# Patient Record
Sex: MALE | URBAN - METROPOLITAN AREA
[De-identification: names, ages, dates, MRNs, and addresses within clinical notes are randomized per-mention and may not be internally consistent; named-entity substitution may affect disease eponyms.]

---

## 2021-01-15 DIAGNOSIS — Z23 NEED FOR VACCINATION: ICD-10-CM

## 2022-01-01 ENCOUNTER — APPOINTMENT (OUTPATIENT)
Dept: RADIOLOGY | Facility: MEDICAL CENTER | Age: 77
DRG: 862 | End: 2022-01-01
Attending: STUDENT IN AN ORGANIZED HEALTH CARE EDUCATION/TRAINING PROGRAM
Payer: MEDICARE

## 2022-01-01 ENCOUNTER — HOME CARE VISIT (OUTPATIENT)
Dept: HOSPICE | Facility: HOSPICE | Age: 77
End: 2022-01-01
Payer: MEDICARE

## 2022-01-01 ENCOUNTER — APPOINTMENT (OUTPATIENT)
Dept: RADIOLOGY | Facility: MEDICAL CENTER | Age: 77
DRG: 862 | End: 2022-01-01
Attending: EMERGENCY MEDICINE
Payer: MEDICARE

## 2022-01-01 ENCOUNTER — HOSPITAL ENCOUNTER (INPATIENT)
Facility: MEDICAL CENTER | Age: 77
LOS: 1 days | DRG: 951 | End: 2022-03-10
Attending: INTERNAL MEDICINE | Admitting: INTERNAL MEDICINE
Payer: COMMERCIAL

## 2022-01-01 ENCOUNTER — HOSPICE ADMISSION (OUTPATIENT)
Dept: HOSPICE | Facility: HOSPICE | Age: 77
End: 2022-01-01
Payer: MEDICARE

## 2022-01-01 ENCOUNTER — HOSPITAL ENCOUNTER (INPATIENT)
Facility: MEDICAL CENTER | Age: 77
LOS: 5 days | DRG: 862 | End: 2022-03-09
Attending: EMERGENCY MEDICINE | Admitting: STUDENT IN AN ORGANIZED HEALTH CARE EDUCATION/TRAINING PROGRAM
Payer: MEDICARE

## 2022-01-01 ENCOUNTER — APPOINTMENT (OUTPATIENT)
Dept: CARDIOLOGY | Facility: MEDICAL CENTER | Age: 77
DRG: 862 | End: 2022-01-01
Attending: INTERNAL MEDICINE
Payer: MEDICARE

## 2022-01-01 VITALS
DIASTOLIC BLOOD PRESSURE: 72 MMHG | HEIGHT: 74 IN | OXYGEN SATURATION: 70 % | HEART RATE: 102 BPM | RESPIRATION RATE: 15 BRPM | TEMPERATURE: 97 F | SYSTOLIC BLOOD PRESSURE: 121 MMHG | WEIGHT: 209 LBS | BODY MASS INDEX: 26.82 KG/M2

## 2022-01-01 VITALS — HEART RATE: 138 BPM | OXYGEN SATURATION: 51 % | RESPIRATION RATE: 27 BRPM

## 2022-01-01 DIAGNOSIS — I74.10 AORTIC THROMBUS (HCC): ICD-10-CM

## 2022-01-01 DIAGNOSIS — D73.5 SPLENIC INFARCT: ICD-10-CM

## 2022-01-01 DIAGNOSIS — N28.0 RENAL INFARCT (HCC): ICD-10-CM

## 2022-01-01 DIAGNOSIS — I63.10 CEREBROVASCULAR ACCIDENT (CVA) DUE TO EMBOLISM OF PRECEREBRAL ARTERY (HCC): ICD-10-CM

## 2022-01-01 DIAGNOSIS — I63.423 CEREBROVASCULAR ACCIDENT (CVA) DUE TO BILATERAL EMBOLISM OF ANTERIOR CEREBRAL ARTERIES (HCC): ICD-10-CM

## 2022-01-01 DIAGNOSIS — A41.9 SEPTIC SHOCK (HCC): ICD-10-CM

## 2022-01-01 DIAGNOSIS — J96.01 ACUTE RESPIRATORY FAILURE WITH HYPOXIA (HCC): ICD-10-CM

## 2022-01-01 DIAGNOSIS — I26.01 ACUTE SEPTIC PULMONARY EMBOLISM WITH ACUTE COR PULMONALE (HCC): ICD-10-CM

## 2022-01-01 DIAGNOSIS — R65.21 SEPTIC SHOCK (HCC): ICD-10-CM

## 2022-01-01 LAB
ABO + RH BLD: NORMAL
ABO GROUP BLD: NORMAL
ALBUMIN SERPL BCP-MCNC: 2.7 G/DL (ref 3.2–4.9)
ALBUMIN SERPL BCP-MCNC: 3 G/DL (ref 3.2–4.9)
ALBUMIN SERPL BCP-MCNC: 3.1 G/DL (ref 3.2–4.9)
ALBUMIN/GLOB SERPL: 0.8 G/DL
ALBUMIN/GLOB SERPL: 0.9 G/DL
ALBUMIN/GLOB SERPL: 0.9 G/DL
ALP SERPL-CCNC: 85 U/L (ref 30–99)
ALP SERPL-CCNC: 86 U/L (ref 30–99)
ALP SERPL-CCNC: 92 U/L (ref 30–99)
ALT SERPL-CCNC: 14 U/L (ref 2–50)
ALT SERPL-CCNC: 15 U/L (ref 2–50)
ALT SERPL-CCNC: 19 U/L (ref 2–50)
ANION GAP SERPL CALC-SCNC: 10 MMOL/L (ref 7–16)
ANION GAP SERPL CALC-SCNC: 11 MMOL/L (ref 7–16)
ANION GAP SERPL CALC-SCNC: 12 MMOL/L (ref 7–16)
ANION GAP SERPL CALC-SCNC: 13 MMOL/L (ref 7–16)
ANION GAP SERPL CALC-SCNC: 16 MMOL/L (ref 7–16)
ANION GAP SERPL CALC-SCNC: 9 MMOL/L (ref 7–16)
APPEARANCE UR: CLEAR
APTT PPP: 145.9 SEC (ref 24.7–36)
APTT PPP: 30.5 SEC (ref 24.7–36)
APTT PPP: 65.8 SEC (ref 24.7–36)
AST SERPL-CCNC: 17 U/L (ref 12–45)
AST SERPL-CCNC: 19 U/L (ref 12–45)
AST SERPL-CCNC: 26 U/L (ref 12–45)
BACTERIA #/AREA URNS HPF: NEGATIVE /HPF
BACTERIA BLD CULT: NORMAL
BACTERIA SPEC RESP CULT: NORMAL
BACTERIA UR CULT: NORMAL
BASE EXCESS BLDA CALC-SCNC: -1 MMOL/L (ref -4–3)
BASE EXCESS BLDA CALC-SCNC: -3 MMOL/L (ref -4–3)
BASE EXCESS BLDA CALC-SCNC: -6 MMOL/L (ref -4–3)
BASE EXCESS BLDV CALC-SCNC: 0 MMOL/L (ref -4–3)
BASOPHILS # BLD AUTO: 0.2 % (ref 0–1.8)
BASOPHILS # BLD AUTO: 0.3 % (ref 0–1.8)
BASOPHILS # BLD AUTO: 0.4 % (ref 0–1.8)
BASOPHILS # BLD AUTO: 0.5 % (ref 0–1.8)
BASOPHILS # BLD: 0.02 K/UL (ref 0–0.12)
BASOPHILS # BLD: 0.03 K/UL (ref 0–0.12)
BASOPHILS # BLD: 0.05 K/UL (ref 0–0.12)
BASOPHILS # BLD: 0.06 K/UL (ref 0–0.12)
BILIRUB SERPL-MCNC: 0.4 MG/DL (ref 0.1–1.5)
BILIRUB SERPL-MCNC: 0.4 MG/DL (ref 0.1–1.5)
BILIRUB SERPL-MCNC: 0.6 MG/DL (ref 0.1–1.5)
BILIRUB UR QL STRIP.AUTO: NEGATIVE
BLD GP AB SCN SERPL QL: NORMAL
BODY TEMPERATURE: ABNORMAL DEGREES
BREATHS SETTING VENT: 18
BREATHS SETTING VENT: 20
BREATHS SETTING VENT: 20
BREATHS SETTING VENT: 26
BUN SERPL-MCNC: 19 MG/DL (ref 8–22)
BUN SERPL-MCNC: 21 MG/DL (ref 8–22)
BUN SERPL-MCNC: 24 MG/DL (ref 8–22)
BUN SERPL-MCNC: 25 MG/DL (ref 8–22)
BUN SERPL-MCNC: 26 MG/DL (ref 8–22)
BUN SERPL-MCNC: 26 MG/DL (ref 8–22)
CA-I BLD ISE-SCNC: 1.32 MMOL/L (ref 1.1–1.3)
CALCIUM SERPL-MCNC: 8.7 MG/DL (ref 8.5–10.5)
CALCIUM SERPL-MCNC: 8.8 MG/DL (ref 8.5–10.5)
CALCIUM SERPL-MCNC: 8.8 MG/DL (ref 8.5–10.5)
CALCIUM SERPL-MCNC: 9 MG/DL (ref 8.5–10.5)
CALCIUM SERPL-MCNC: 9.3 MG/DL (ref 8.5–10.5)
CALCIUM SERPL-MCNC: 9.3 MG/DL (ref 8.5–10.5)
CHLORIDE SERPL-SCNC: 100 MMOL/L (ref 96–112)
CHLORIDE SERPL-SCNC: 101 MMOL/L (ref 96–112)
CHLORIDE SERPL-SCNC: 101 MMOL/L (ref 96–112)
CHLORIDE SERPL-SCNC: 110 MMOL/L (ref 96–112)
CHLORIDE SERPL-SCNC: 118 MMOL/L (ref 96–112)
CHLORIDE SERPL-SCNC: 120 MMOL/L (ref 96–112)
CHOLEST SERPL-MCNC: 74 MG/DL (ref 100–199)
CHOLEST SERPL-MCNC: 78 MG/DL (ref 100–199)
CO2 BLDA-SCNC: 20 MMOL/L (ref 20–33)
CO2 BLDA-SCNC: 22 MMOL/L (ref 20–33)
CO2 BLDA-SCNC: 27 MMOL/L (ref 20–33)
CO2 BLDV-SCNC: 25 MMOL/L (ref 20–33)
CO2 SERPL-SCNC: 17 MMOL/L (ref 20–33)
CO2 SERPL-SCNC: 19 MMOL/L (ref 20–33)
CO2 SERPL-SCNC: 20 MMOL/L (ref 20–33)
CO2 SERPL-SCNC: 20 MMOL/L (ref 20–33)
CO2 SERPL-SCNC: 23 MMOL/L (ref 20–33)
CO2 SERPL-SCNC: 24 MMOL/L (ref 20–33)
COLOR UR: YELLOW
CREAT SERPL-MCNC: 0.96 MG/DL (ref 0.5–1.4)
CREAT SERPL-MCNC: 0.97 MG/DL (ref 0.5–1.4)
CREAT SERPL-MCNC: 0.98 MG/DL (ref 0.5–1.4)
CREAT SERPL-MCNC: 1.01 MG/DL (ref 0.5–1.4)
CREAT SERPL-MCNC: 1.11 MG/DL (ref 0.5–1.4)
CREAT SERPL-MCNC: 1.15 MG/DL (ref 0.5–1.4)
CRP SERPL HS-MCNC: 12.69 MG/DL (ref 0–0.75)
CRP SERPL HS-MCNC: 16.09 MG/DL (ref 0–0.75)
CRP SERPL HS-MCNC: 17.02 MG/DL (ref 0–0.75)
DELSYS IDSYS: ABNORMAL
EKG IMPRESSION: NORMAL
EKG IMPRESSION: NORMAL
END TIDAL CARBON DIOXIDE IECO2: 22 MMHG
END TIDAL CARBON DIOXIDE IECO2: 28 MMHG
END TIDAL CARBON DIOXIDE IECO2: 29 MMHG
EOSINOPHIL # BLD AUTO: 0.02 K/UL (ref 0–0.51)
EOSINOPHIL # BLD AUTO: 0.09 K/UL (ref 0–0.51)
EOSINOPHIL # BLD AUTO: 0.13 K/UL (ref 0–0.51)
EOSINOPHIL # BLD AUTO: 0.14 K/UL (ref 0–0.51)
EOSINOPHIL # BLD AUTO: 0.21 K/UL (ref 0–0.51)
EOSINOPHIL # BLD AUTO: 0.28 K/UL (ref 0–0.51)
EOSINOPHIL NFR BLD: 0.1 % (ref 0–6.9)
EOSINOPHIL NFR BLD: 0.6 % (ref 0–6.9)
EOSINOPHIL NFR BLD: 0.9 % (ref 0–6.9)
EOSINOPHIL NFR BLD: 1.6 % (ref 0–6.9)
EOSINOPHIL NFR BLD: 2 % (ref 0–6.9)
EOSINOPHIL NFR BLD: 2.5 % (ref 0–6.9)
EPI CELLS #/AREA URNS HPF: NEGATIVE /HPF
ERYTHROCYTE [DISTWIDTH] IN BLOOD BY AUTOMATED COUNT: 46.1 FL (ref 35.9–50)
ERYTHROCYTE [DISTWIDTH] IN BLOOD BY AUTOMATED COUNT: 47.3 FL (ref 35.9–50)
ERYTHROCYTE [DISTWIDTH] IN BLOOD BY AUTOMATED COUNT: 47.3 FL (ref 35.9–50)
ERYTHROCYTE [DISTWIDTH] IN BLOOD BY AUTOMATED COUNT: 48.3 FL (ref 35.9–50)
ERYTHROCYTE [DISTWIDTH] IN BLOOD BY AUTOMATED COUNT: 48.6 FL (ref 35.9–50)
ERYTHROCYTE [DISTWIDTH] IN BLOOD BY AUTOMATED COUNT: 48.6 FL (ref 35.9–50)
ERYTHROCYTE [SEDIMENTATION RATE] IN BLOOD BY WESTERGREN METHOD: 85 MM/HOUR (ref 0–20)
EST. AVERAGE GLUCOSE BLD GHB EST-MCNC: 123 MG/DL
FLUAV RNA SPEC QL NAA+PROBE: NEGATIVE
FLUBV RNA SPEC QL NAA+PROBE: NEGATIVE
GLOBULIN SER CALC-MCNC: 3.2 G/DL (ref 1.9–3.5)
GLOBULIN SER CALC-MCNC: 3.4 G/DL (ref 1.9–3.5)
GLOBULIN SER CALC-MCNC: 3.5 G/DL (ref 1.9–3.5)
GLUCOSE BLD STRIP.AUTO-MCNC: 107 MG/DL (ref 65–99)
GLUCOSE BLD STRIP.AUTO-MCNC: 109 MG/DL (ref 65–99)
GLUCOSE BLD STRIP.AUTO-MCNC: 109 MG/DL (ref 65–99)
GLUCOSE BLD STRIP.AUTO-MCNC: 110 MG/DL (ref 65–99)
GLUCOSE BLD STRIP.AUTO-MCNC: 111 MG/DL (ref 65–99)
GLUCOSE BLD STRIP.AUTO-MCNC: 113 MG/DL (ref 65–99)
GLUCOSE BLD STRIP.AUTO-MCNC: 114 MG/DL (ref 65–99)
GLUCOSE BLD STRIP.AUTO-MCNC: 116 MG/DL (ref 65–99)
GLUCOSE BLD STRIP.AUTO-MCNC: 117 MG/DL (ref 65–99)
GLUCOSE BLD STRIP.AUTO-MCNC: 125 MG/DL (ref 65–99)
GLUCOSE BLD STRIP.AUTO-MCNC: 127 MG/DL (ref 65–99)
GLUCOSE BLD STRIP.AUTO-MCNC: 136 MG/DL (ref 65–99)
GLUCOSE BLD STRIP.AUTO-MCNC: 140 MG/DL (ref 65–99)
GLUCOSE BLD STRIP.AUTO-MCNC: 156 MG/DL (ref 65–99)
GLUCOSE BLD STRIP.AUTO-MCNC: 193 MG/DL (ref 65–99)
GLUCOSE SERPL-MCNC: 138 MG/DL (ref 65–99)
GLUCOSE SERPL-MCNC: 141 MG/DL (ref 65–99)
GLUCOSE SERPL-MCNC: 142 MG/DL (ref 65–99)
GLUCOSE SERPL-MCNC: 181 MG/DL (ref 65–99)
GLUCOSE SERPL-MCNC: 217 MG/DL (ref 65–99)
GLUCOSE SERPL-MCNC: 220 MG/DL (ref 65–99)
GLUCOSE UR STRIP.AUTO-MCNC: NEGATIVE MG/DL
GRAM STN SPEC: NORMAL
GRAM STN SPEC: NORMAL
HBA1C MFR BLD: 5.9 % (ref 4–5.6)
HCO3 BLDA-SCNC: 19 MMOL/L (ref 17–25)
HCO3 BLDA-SCNC: 21.1 MMOL/L (ref 17–25)
HCO3 BLDA-SCNC: 25.4 MMOL/L (ref 17–25)
HCO3 BLDV-SCNC: 24 MMOL/L (ref 24–28)
HCT VFR BLD AUTO: 28.9 % (ref 42–52)
HCT VFR BLD AUTO: 30 % (ref 42–52)
HCT VFR BLD AUTO: 31 % (ref 42–52)
HCT VFR BLD AUTO: 31.7 % (ref 42–52)
HCT VFR BLD AUTO: 31.7 % (ref 42–52)
HCT VFR BLD AUTO: 32.2 % (ref 42–52)
HCT VFR BLD CALC: 27 % (ref 42–52)
HDLC SERPL-MCNC: 28 MG/DL
HDLC SERPL-MCNC: 29 MG/DL
HGB BLD-MCNC: 10.1 G/DL (ref 14–18)
HGB BLD-MCNC: 10.2 G/DL (ref 14–18)
HGB BLD-MCNC: 8.9 G/DL (ref 14–18)
HGB BLD-MCNC: 9.2 G/DL (ref 14–18)
HGB BLD-MCNC: 9.2 G/DL (ref 14–18)
HGB BLD-MCNC: 9.5 G/DL (ref 14–18)
HGB BLD-MCNC: 9.9 G/DL (ref 14–18)
HOROWITZ INDEX BLDA+IHG-RTO: 172 MM[HG]
HOROWITZ INDEX BLDA+IHG-RTO: 223 MM[HG]
HOROWITZ INDEX BLDA+IHG-RTO: 300 MM[HG]
HOROWITZ INDEX BLDV+IHG-RTO: 80 MM[HG]
HYALINE CASTS #/AREA URNS LPF: ABNORMAL /LPF
IMM GRANULOCYTES # BLD AUTO: 0.05 K/UL (ref 0–0.11)
IMM GRANULOCYTES # BLD AUTO: 0.05 K/UL (ref 0–0.11)
IMM GRANULOCYTES # BLD AUTO: 0.06 K/UL (ref 0–0.11)
IMM GRANULOCYTES # BLD AUTO: 0.07 K/UL (ref 0–0.11)
IMM GRANULOCYTES # BLD AUTO: 0.08 K/UL (ref 0–0.11)
IMM GRANULOCYTES # BLD AUTO: 0.08 K/UL (ref 0–0.11)
IMM GRANULOCYTES NFR BLD AUTO: 0.5 % (ref 0–0.9)
IMM GRANULOCYTES NFR BLD AUTO: 0.6 % (ref 0–0.9)
IMM GRANULOCYTES NFR BLD AUTO: 0.7 % (ref 0–0.9)
INR PPP: 1.35 (ref 0.87–1.13)
INR PPP: 1.44 (ref 0.87–1.13)
INR PPP: 1.48 (ref 0.87–1.13)
KETONES UR STRIP.AUTO-MCNC: NEGATIVE MG/DL
LACTATE BLD-SCNC: 1.1 MMOL/L (ref 0.5–2)
LACTATE BLD-SCNC: 1.2 MMOL/L (ref 0.5–2)
LACTATE BLD-SCNC: 2.3 MMOL/L (ref 0.5–2)
LDLC SERPL CALC-MCNC: 34 MG/DL
LDLC SERPL CALC-MCNC: 37 MG/DL
LEUKOCYTE ESTERASE UR QL STRIP.AUTO: NEGATIVE
LV EJECT FRACT  99904: 55
LYMPHOCYTES # BLD AUTO: 0.68 K/UL (ref 1–4.8)
LYMPHOCYTES # BLD AUTO: 0.78 K/UL (ref 1–4.8)
LYMPHOCYTES # BLD AUTO: 0.99 K/UL (ref 1–4.8)
LYMPHOCYTES # BLD AUTO: 1.08 K/UL (ref 1–4.8)
LYMPHOCYTES # BLD AUTO: 1.24 K/UL (ref 1–4.8)
LYMPHOCYTES # BLD AUTO: 1.43 K/UL (ref 1–4.8)
LYMPHOCYTES NFR BLD: 10.1 % (ref 22–41)
LYMPHOCYTES NFR BLD: 6.4 % (ref 22–41)
LYMPHOCYTES NFR BLD: 7.1 % (ref 22–41)
LYMPHOCYTES NFR BLD: 8.7 % (ref 22–41)
LYMPHOCYTES NFR BLD: 8.8 % (ref 22–41)
LYMPHOCYTES NFR BLD: 8.9 % (ref 22–41)
MAGNESIUM SERPL-MCNC: 2.1 MG/DL (ref 1.5–2.5)
MAGNESIUM SERPL-MCNC: 2.2 MG/DL (ref 1.5–2.5)
MAGNESIUM SERPL-MCNC: 2.3 MG/DL (ref 1.5–2.5)
MCH RBC QN AUTO: 27.1 PG (ref 27–33)
MCH RBC QN AUTO: 27.4 PG (ref 27–33)
MCH RBC QN AUTO: 27.5 PG (ref 27–33)
MCH RBC QN AUTO: 27.6 PG (ref 27–33)
MCHC RBC AUTO-ENTMCNC: 30.6 G/DL (ref 33.7–35.3)
MCHC RBC AUTO-ENTMCNC: 30.7 G/DL (ref 33.7–35.3)
MCHC RBC AUTO-ENTMCNC: 30.8 G/DL (ref 33.7–35.3)
MCHC RBC AUTO-ENTMCNC: 31.2 G/DL (ref 33.7–35.3)
MCHC RBC AUTO-ENTMCNC: 31.4 G/DL (ref 33.7–35.3)
MCHC RBC AUTO-ENTMCNC: 32.2 G/DL (ref 33.7–35.3)
MCV RBC AUTO: 85.9 FL (ref 81.4–97.8)
MCV RBC AUTO: 87.3 FL (ref 81.4–97.8)
MCV RBC AUTO: 87.8 FL (ref 81.4–97.8)
MCV RBC AUTO: 88.6 FL (ref 81.4–97.8)
MCV RBC AUTO: 88.9 FL (ref 81.4–97.8)
MCV RBC AUTO: 89.6 FL (ref 81.4–97.8)
MICRO URNS: ABNORMAL
MODE IMODE: ABNORMAL
MONOCYTES # BLD AUTO: 0.64 K/UL (ref 0–0.85)
MONOCYTES # BLD AUTO: 0.74 K/UL (ref 0–0.85)
MONOCYTES # BLD AUTO: 0.76 K/UL (ref 0–0.85)
MONOCYTES # BLD AUTO: 0.9 K/UL (ref 0–0.85)
MONOCYTES # BLD AUTO: 1 K/UL (ref 0–0.85)
MONOCYTES # BLD AUTO: 1.02 K/UL (ref 0–0.85)
MONOCYTES NFR BLD AUTO: 6.3 % (ref 0–13.4)
MONOCYTES NFR BLD AUTO: 6.7 % (ref 0–13.4)
MONOCYTES NFR BLD AUTO: 6.7 % (ref 0–13.4)
MONOCYTES NFR BLD AUTO: 7 % (ref 0–13.4)
MONOCYTES NFR BLD AUTO: 7.1 % (ref 0–13.4)
MONOCYTES NFR BLD AUTO: 7.2 % (ref 0–13.4)
NEUTROPHILS # BLD AUTO: 11.72 K/UL (ref 1.82–7.42)
NEUTROPHILS # BLD AUTO: 11.8 K/UL (ref 1.82–7.42)
NEUTROPHILS # BLD AUTO: 12.86 K/UL (ref 1.82–7.42)
NEUTROPHILS # BLD AUTO: 7.22 K/UL (ref 1.82–7.42)
NEUTROPHILS # BLD AUTO: 8.97 K/UL (ref 1.82–7.42)
NEUTROPHILS # BLD AUTO: 8.99 K/UL (ref 1.82–7.42)
NEUTROPHILS NFR BLD: 80.9 % (ref 44–72)
NEUTROPHILS NFR BLD: 81.5 % (ref 44–72)
NEUTROPHILS NFR BLD: 82.5 % (ref 44–72)
NEUTROPHILS NFR BLD: 82.6 % (ref 44–72)
NEUTROPHILS NFR BLD: 83.7 % (ref 44–72)
NEUTROPHILS NFR BLD: 84.8 % (ref 44–72)
NITRITE UR QL STRIP.AUTO: NEGATIVE
NRBC # BLD AUTO: 0 K/UL
NRBC BLD-RTO: 0 /100 WBC
NT-PROBNP SERPL IA-MCNC: 318 PG/ML (ref 0–125)
O2/TOTAL GAS SETTING VFR VENT: 100 %
O2/TOTAL GAS SETTING VFR VENT: 40 %
PCO2 BLDA: 32.4 MMHG (ref 26–37)
PCO2 BLDA: 33.3 MMHG (ref 26–37)
PCO2 BLDA: 47.9 MMHG (ref 26–37)
PCO2 BLDV: 35.3 MMHG (ref 41–51)
PCO2 TEMP ADJ BLDA: 32.4 MMHG (ref 26–37)
PCO2 TEMP ADJ BLDA: 33.4 MMHG (ref 26–37)
PCO2 TEMP ADJ BLDA: 46.6 MMHG (ref 26–37)
PCO2 TEMP ADJ BLDV: 34.2 MMHG (ref 41–51)
PEEP END EXPIRATORY PRESSURE IPEEP: 8 CMH20
PH BLDA: 7.33 [PH] (ref 7.4–7.5)
PH BLDA: 7.37 [PH] (ref 7.4–7.5)
PH BLDA: 7.42 [PH] (ref 7.4–7.5)
PH BLDV: 7.44 [PH] (ref 7.31–7.45)
PH TEMP ADJ BLDA: 7.34 [PH] (ref 7.4–7.5)
PH TEMP ADJ BLDA: 7.36 [PH] (ref 7.4–7.5)
PH TEMP ADJ BLDA: 7.42 [PH] (ref 7.4–7.5)
PH TEMP ADJ BLDV: 7.45 [PH] (ref 7.31–7.45)
PH UR STRIP.AUTO: 6 [PH] (ref 5–8)
PHOSPHATE SERPL-MCNC: 2.5 MG/DL (ref 2.5–4.5)
PHOSPHATE SERPL-MCNC: 3.2 MG/DL (ref 2.5–4.5)
PHOSPHATE SERPL-MCNC: 3.3 MG/DL (ref 2.5–4.5)
PLATELET # BLD AUTO: 209 K/UL (ref 164–446)
PLATELET # BLD AUTO: 214 K/UL (ref 164–446)
PLATELET # BLD AUTO: 229 K/UL (ref 164–446)
PLATELET # BLD AUTO: 276 K/UL (ref 164–446)
PLATELET # BLD AUTO: 301 K/UL (ref 164–446)
PLATELET # BLD AUTO: 301 K/UL (ref 164–446)
PMV BLD AUTO: 9.2 FL (ref 9–12.9)
PMV BLD AUTO: 9.4 FL (ref 9–12.9)
PMV BLD AUTO: 9.6 FL (ref 9–12.9)
PMV BLD AUTO: 9.8 FL (ref 9–12.9)
PO2 BLDA: 120 MMHG (ref 64–87)
PO2 BLDA: 172 MMHG (ref 64–87)
PO2 BLDA: 89 MMHG (ref 64–87)
PO2 BLDV: 32 MMHG (ref 25–40)
PO2 TEMP ADJ BLDA: 121 MMHG (ref 64–87)
PO2 TEMP ADJ BLDA: 168 MMHG (ref 64–87)
PO2 TEMP ADJ BLDA: 89 MMHG (ref 64–87)
PO2 TEMP ADJ BLDV: 31 MMHG (ref 25–40)
POTASSIUM BLD-SCNC: 4.2 MMOL/L (ref 3.6–5.5)
POTASSIUM SERPL-SCNC: 3.5 MMOL/L (ref 3.6–5.5)
POTASSIUM SERPL-SCNC: 4.1 MMOL/L (ref 3.6–5.5)
POTASSIUM SERPL-SCNC: 4.3 MMOL/L (ref 3.6–5.5)
POTASSIUM SERPL-SCNC: 4.4 MMOL/L (ref 3.6–5.5)
POTASSIUM SERPL-SCNC: 4.4 MMOL/L (ref 3.6–5.5)
POTASSIUM SERPL-SCNC: 4.5 MMOL/L (ref 3.6–5.5)
PREALB SERPL-MCNC: 6.3 MG/DL (ref 18–38)
PROCALCITONIN SERPL-MCNC: 0.13 NG/ML
PROT SERPL-MCNC: 5.9 G/DL (ref 6–8.2)
PROT SERPL-MCNC: 6.4 G/DL (ref 6–8.2)
PROT SERPL-MCNC: 6.6 G/DL (ref 6–8.2)
PROT UR QL STRIP: 30 MG/DL
PROTHROMBIN TIME: 16.2 SEC (ref 12–14.6)
PROTHROMBIN TIME: 17.1 SEC (ref 12–14.6)
PROTHROMBIN TIME: 17.5 SEC (ref 12–14.6)
RBC # BLD AUTO: 3.25 M/UL (ref 4.7–6.1)
RBC # BLD AUTO: 3.35 M/UL (ref 4.7–6.1)
RBC # BLD AUTO: 3.5 M/UL (ref 4.7–6.1)
RBC # BLD AUTO: 3.61 M/UL (ref 4.7–6.1)
RBC # BLD AUTO: 3.69 M/UL (ref 4.7–6.1)
RBC # BLD AUTO: 3.69 M/UL (ref 4.7–6.1)
RBC # URNS HPF: ABNORMAL /HPF
RBC UR QL AUTO: NEGATIVE
RH BLD: NORMAL
RSV RNA SPEC QL NAA+PROBE: NEGATIVE
SAO2 % BLDA: 97 % (ref 93–99)
SAO2 % BLDA: 99 % (ref 93–99)
SAO2 % BLDA: 99 % (ref 93–99)
SAO2 % BLDV: 66 %
SARS-COV-2 AB SERPL QL IA: NORMAL
SARS-COV-2 RNA RESP QL NAA+PROBE: NOTDETECTED
SIGNIFICANT IND 70042: NORMAL
SITE SITE: NORMAL
SODIUM BLD-SCNC: 138 MMOL/L (ref 135–145)
SODIUM SERPL-SCNC: 134 MMOL/L (ref 135–145)
SODIUM SERPL-SCNC: 135 MMOL/L (ref 135–145)
SODIUM SERPL-SCNC: 137 MMOL/L (ref 135–145)
SODIUM SERPL-SCNC: 137 MMOL/L (ref 135–145)
SODIUM SERPL-SCNC: 138 MMOL/L (ref 135–145)
SODIUM SERPL-SCNC: 139 MMOL/L (ref 135–145)
SODIUM SERPL-SCNC: 139 MMOL/L (ref 135–145)
SODIUM SERPL-SCNC: 146 MMOL/L (ref 135–145)
SODIUM SERPL-SCNC: 147 MMOL/L (ref 135–145)
SODIUM SERPL-SCNC: 147 MMOL/L (ref 135–145)
SODIUM SERPL-SCNC: 148 MMOL/L (ref 135–145)
SODIUM SERPL-SCNC: 151 MMOL/L (ref 135–145)
SODIUM SERPL-SCNC: 152 MMOL/L (ref 135–145)
SODIUM SERPL-SCNC: 153 MMOL/L (ref 135–145)
SODIUM SERPL-SCNC: 155 MMOL/L (ref 135–145)
SOURCE SOURCE: NORMAL
SP GR UR STRIP.AUTO: 1.02
SPECIMEN DRAWN FROM PATIENT: ABNORMAL
SPECIMEN SOURCE: NORMAL
TIDAL VOLUME IVT: 440 ML
TIDAL VOLUME IVT: 500 ML
TRIGL SERPL-MCNC: 59 MG/DL (ref 0–149)
TRIGL SERPL-MCNC: 60 MG/DL (ref 0–149)
TRIGL SERPL-MCNC: 63 MG/DL (ref 0–149)
TROPONIN T SERPL-MCNC: 284 NG/L (ref 6–19)
TROPONIN T SERPL-MCNC: 313 NG/L (ref 6–19)
TROPONIN T SERPL-MCNC: 32 NG/L (ref 6–19)
TROPONIN T SERPL-MCNC: 406 NG/L (ref 6–19)
TROPONIN T SERPL-MCNC: 430 NG/L (ref 6–19)
TROPONIN T SERPL-MCNC: 549 NG/L (ref 6–19)
TROPONIN T SERPL-MCNC: 646 NG/L (ref 6–19)
TSH SERPL DL<=0.005 MIU/L-ACNC: 2.54 UIU/ML (ref 0.38–5.33)
UFH PPP CHRO-ACNC: 0.15 IU/ML
UFH PPP CHRO-ACNC: 0.24 IU/ML
UFH PPP CHRO-ACNC: 0.28 IU/ML
UFH PPP CHRO-ACNC: 0.36 IU/ML
UFH PPP CHRO-ACNC: 0.43 IU/ML
UFH PPP CHRO-ACNC: 0.52 IU/ML
UFH PPP CHRO-ACNC: 0.77 IU/ML
UFH PPP CHRO-ACNC: <0.1 IU/ML
UROBILINOGEN UR STRIP.AUTO-MCNC: 1 MG/DL
VANCOMYCIN PEAK SERPL-MCNC: 21.7 UG/ML (ref 20–40)
VANCOMYCIN TROUGH SERPL-MCNC: 16 UG/ML (ref 10–20)
VIT B12 SERPL-MCNC: 387 PG/ML (ref 211–911)
WBC # BLD AUTO: 10.7 K/UL (ref 4.8–10.8)
WBC # BLD AUTO: 11.1 K/UL (ref 4.8–10.8)
WBC # BLD AUTO: 14.2 K/UL (ref 4.8–10.8)
WBC # BLD AUTO: 14.3 K/UL (ref 4.8–10.8)
WBC # BLD AUTO: 15.2 K/UL (ref 4.8–10.8)
WBC # BLD AUTO: 8.9 K/UL (ref 4.8–10.8)
WBC #/AREA URNS HPF: ABNORMAL /HPF

## 2022-01-01 PROCEDURE — 700101 HCHG RX REV CODE 250: Performed by: EMERGENCY MEDICINE

## 2022-01-01 PROCEDURE — 80048 BASIC METABOLIC PNL TOTAL CA: CPT

## 2022-01-01 PROCEDURE — A9270 NON-COVERED ITEM OR SERVICE: HCPCS | Performed by: PSYCHIATRY & NEUROLOGY

## 2022-01-01 PROCEDURE — 72158 MRI LUMBAR SPINE W/O & W/DYE: CPT

## 2022-01-01 PROCEDURE — 99233 SBSQ HOSP IP/OBS HIGH 50: CPT | Performed by: SURGERY

## 2022-01-01 PROCEDURE — 0241U HCHG SARS-COV-2 COVID-19 NFCT DS RESP RNA 4 TRGT MIC: CPT

## 2022-01-01 PROCEDURE — 70450 CT HEAD/BRAIN W/O DYE: CPT | Mod: MG

## 2022-01-01 PROCEDURE — 74177 CT ABD & PELVIS W/CONTRAST: CPT

## 2022-01-01 PROCEDURE — 96365 THER/PROPH/DIAG IV INF INIT: CPT

## 2022-01-01 PROCEDURE — 71045 X-RAY EXAM CHEST 1 VIEW: CPT

## 2022-01-01 PROCEDURE — 700102 HCHG RX REV CODE 250 W/ 637 OVERRIDE(OP): Performed by: STUDENT IN AN ORGANIZED HEALTH CARE EDUCATION/TRAINING PROGRAM

## 2022-01-01 PROCEDURE — 700111 HCHG RX REV CODE 636 W/ 250 OVERRIDE (IP): Performed by: STUDENT IN AN ORGANIZED HEALTH CARE EDUCATION/TRAINING PROGRAM

## 2022-01-01 PROCEDURE — 31500 INSERT EMERGENCY AIRWAY: CPT

## 2022-01-01 PROCEDURE — T2045 HOSPICE GENERAL CARE: HCPCS

## 2022-01-01 PROCEDURE — 87040 BLOOD CULTURE FOR BACTERIA: CPT

## 2022-01-01 PROCEDURE — 82962 GLUCOSE BLOOD TEST: CPT | Mod: 91

## 2022-01-01 PROCEDURE — A9270 NON-COVERED ITEM OR SERVICE: HCPCS | Performed by: STUDENT IN AN ORGANIZED HEALTH CARE EDUCATION/TRAINING PROGRAM

## 2022-01-01 PROCEDURE — 84100 ASSAY OF PHOSPHORUS: CPT

## 2022-01-01 PROCEDURE — 84478 ASSAY OF TRIGLYCERIDES: CPT

## 2022-01-01 PROCEDURE — 85730 THROMBOPLASTIN TIME PARTIAL: CPT

## 2022-01-01 PROCEDURE — 80053 COMPREHEN METABOLIC PANEL: CPT

## 2022-01-01 PROCEDURE — 93005 ELECTROCARDIOGRAM TRACING: CPT | Performed by: STUDENT IN AN ORGANIZED HEALTH CARE EDUCATION/TRAINING PROGRAM

## 2022-01-01 PROCEDURE — 700105 HCHG RX REV CODE 258: Performed by: STUDENT IN AN ORGANIZED HEALTH CARE EDUCATION/TRAINING PROGRAM

## 2022-01-01 PROCEDURE — 71275 CT ANGIOGRAPHY CHEST: CPT

## 2022-01-01 PROCEDURE — 82803 BLOOD GASES ANY COMBINATION: CPT

## 2022-01-01 PROCEDURE — 700101 HCHG RX REV CODE 250: Performed by: STUDENT IN AN ORGANIZED HEALTH CARE EDUCATION/TRAINING PROGRAM

## 2022-01-01 PROCEDURE — 700117 HCHG RX CONTRAST REV CODE 255: Performed by: STUDENT IN AN ORGANIZED HEALTH CARE EDUCATION/TRAINING PROGRAM

## 2022-01-01 PROCEDURE — 94799 UNLISTED PULMONARY SVC/PX: CPT

## 2022-01-01 PROCEDURE — 87205 SMEAR GRAM STAIN: CPT

## 2022-01-01 PROCEDURE — 99223 1ST HOSP IP/OBS HIGH 75: CPT | Mod: FS | Performed by: NURSE PRACTITIONER

## 2022-01-01 PROCEDURE — 85025 COMPLETE CBC W/AUTO DIFF WBC: CPT

## 2022-01-01 PROCEDURE — 86140 C-REACTIVE PROTEIN: CPT | Mod: 91

## 2022-01-01 PROCEDURE — 94003 VENT MGMT INPAT SUBQ DAY: CPT

## 2022-01-01 PROCEDURE — 700111 HCHG RX REV CODE 636 W/ 250 OVERRIDE (IP)

## 2022-01-01 PROCEDURE — 700102 HCHG RX REV CODE 250 W/ 637 OVERRIDE(OP): Performed by: INTERNAL MEDICINE

## 2022-01-01 PROCEDURE — 86769 SARS-COV-2 COVID-19 ANTIBODY: CPT

## 2022-01-01 PROCEDURE — 84295 ASSAY OF SERUM SODIUM: CPT | Mod: 91

## 2022-01-01 PROCEDURE — 37799 UNLISTED PX VASCULAR SURGERY: CPT

## 2022-01-01 PROCEDURE — 83735 ASSAY OF MAGNESIUM: CPT

## 2022-01-01 PROCEDURE — 84134 ASSAY OF PREALBUMIN: CPT

## 2022-01-01 PROCEDURE — 36620 INSERTION CATHETER ARTERY: CPT | Performed by: INTERNAL MEDICINE

## 2022-01-01 PROCEDURE — 80202 ASSAY OF VANCOMYCIN: CPT | Mod: 91

## 2022-01-01 PROCEDURE — 99292 CRITICAL CARE ADDL 30 MIN: CPT | Performed by: STUDENT IN AN ORGANIZED HEALTH CARE EDUCATION/TRAINING PROGRAM

## 2022-01-01 PROCEDURE — 99223 1ST HOSP IP/OBS HIGH 75: CPT | Performed by: PHYSICAL MEDICINE & REHABILITATION

## 2022-01-01 PROCEDURE — 94640 AIRWAY INHALATION TREATMENT: CPT

## 2022-01-01 PROCEDURE — 93925 LOWER EXTREMITY STUDY: CPT

## 2022-01-01 PROCEDURE — 85520 HEPARIN ASSAY: CPT | Mod: 91

## 2022-01-01 PROCEDURE — 85520 HEPARIN ASSAY: CPT

## 2022-01-01 PROCEDURE — 86850 RBC ANTIBODY SCREEN: CPT

## 2022-01-01 PROCEDURE — 85730 THROMBOPLASTIN TIME PARTIAL: CPT | Mod: 91

## 2022-01-01 PROCEDURE — 94002 VENT MGMT INPAT INIT DAY: CPT

## 2022-01-01 PROCEDURE — 700105 HCHG RX REV CODE 258: Performed by: INTERNAL MEDICINE

## 2022-01-01 PROCEDURE — 99232 SBSQ HOSP IP/OBS MODERATE 35: CPT | Performed by: NURSE PRACTITIONER

## 2022-01-01 PROCEDURE — 99291 CRITICAL CARE FIRST HOUR: CPT | Mod: GC | Performed by: PSYCHIATRY & NEUROLOGY

## 2022-01-01 PROCEDURE — 70496 CT ANGIOGRAPHY HEAD: CPT

## 2022-01-01 PROCEDURE — 700105 HCHG RX REV CODE 258

## 2022-01-01 PROCEDURE — 83036 HEMOGLOBIN GLYCOSYLATED A1C: CPT

## 2022-01-01 PROCEDURE — 770001 HCHG ROOM/CARE - MED/SURG/GYN PRIV*

## 2022-01-01 PROCEDURE — 302214 INTUBATION BOX: Performed by: EMERGENCY MEDICINE

## 2022-01-01 PROCEDURE — 99223 1ST HOSP IP/OBS HIGH 75: CPT | Performed by: SURGERY

## 2022-01-01 PROCEDURE — 700111 HCHG RX REV CODE 636 W/ 250 OVERRIDE (IP): Performed by: INTERNAL MEDICINE

## 2022-01-01 PROCEDURE — 96367 TX/PROPH/DG ADDL SEQ IV INF: CPT

## 2022-01-01 PROCEDURE — 770022 HCHG ROOM/CARE - ICU (200)

## 2022-01-01 PROCEDURE — 84295 ASSAY OF SERUM SODIUM: CPT

## 2022-01-01 PROCEDURE — 96375 TX/PRO/DX INJ NEW DRUG ADDON: CPT

## 2022-01-01 PROCEDURE — 5A1955Z RESPIRATORY VENTILATION, GREATER THAN 96 CONSECUTIVE HOURS: ICD-10-PCS | Performed by: EMERGENCY MEDICINE

## 2022-01-01 PROCEDURE — 87086 URINE CULTURE/COLONY COUNT: CPT

## 2022-01-01 PROCEDURE — 700111 HCHG RX REV CODE 636 W/ 250 OVERRIDE (IP): Performed by: PSYCHIATRY & NEUROLOGY

## 2022-01-01 PROCEDURE — A9576 INJ PROHANCE MULTIPACK: HCPCS | Performed by: STUDENT IN AN ORGANIZED HEALTH CARE EDUCATION/TRAINING PROGRAM

## 2022-01-01 PROCEDURE — G0299 HHS/HOSPICE OF RN EA 15 MIN: HCPCS

## 2022-01-01 PROCEDURE — 70498 CT ANGIOGRAPHY NECK: CPT

## 2022-01-01 PROCEDURE — 93321 DOPPLER ECHO F-UP/LMTD STD: CPT | Mod: 26 | Performed by: INTERNAL MEDICINE

## 2022-01-01 PROCEDURE — 82803 BLOOD GASES ANY COMBINATION: CPT | Mod: 91

## 2022-01-01 PROCEDURE — 82607 VITAMIN B-12: CPT

## 2022-01-01 PROCEDURE — 80061 LIPID PANEL: CPT

## 2022-01-01 PROCEDURE — 82962 GLUCOSE BLOOD TEST: CPT

## 2022-01-01 PROCEDURE — 85014 HEMATOCRIT: CPT

## 2022-01-01 PROCEDURE — 700101 HCHG RX REV CODE 250: Performed by: INTERNAL MEDICINE

## 2022-01-01 PROCEDURE — 700102 HCHG RX REV CODE 250 W/ 637 OVERRIDE(OP): Performed by: PSYCHIATRY & NEUROLOGY

## 2022-01-01 PROCEDURE — 84132 ASSAY OF SERUM POTASSIUM: CPT

## 2022-01-01 PROCEDURE — 93308 TTE F-UP OR LMTD: CPT | Mod: 26 | Performed by: INTERNAL MEDICINE

## 2022-01-01 PROCEDURE — 36600 WITHDRAWAL OF ARTERIAL BLOOD: CPT

## 2022-01-01 PROCEDURE — 700111 HCHG RX REV CODE 636 W/ 250 OVERRIDE (IP): Performed by: EMERGENCY MEDICINE

## 2022-01-01 PROCEDURE — 85025 COMPLETE CBC W/AUTO DIFF WBC: CPT | Mod: 91

## 2022-01-01 PROCEDURE — 51702 INSERT TEMP BLADDER CATH: CPT

## 2022-01-01 PROCEDURE — 86900 BLOOD TYPING SEROLOGIC ABO: CPT

## 2022-01-01 PROCEDURE — 700117 HCHG RX CONTRAST REV CODE 255: Performed by: INTERNAL MEDICINE

## 2022-01-01 PROCEDURE — 0BH17EZ INSERTION OF ENDOTRACHEAL AIRWAY INTO TRACHEA, VIA NATURAL OR ARTIFICIAL OPENING: ICD-10-PCS | Performed by: EMERGENCY MEDICINE

## 2022-01-01 PROCEDURE — 81001 URINALYSIS AUTO W/SCOPE: CPT

## 2022-01-01 PROCEDURE — 84484 ASSAY OF TROPONIN QUANT: CPT | Mod: 91

## 2022-01-01 PROCEDURE — C1751 CATH, INF, PER/CENT/MIDLINE: HCPCS

## 2022-01-01 PROCEDURE — 70450 CT HEAD/BRAIN W/O DYE: CPT

## 2022-01-01 PROCEDURE — 36620 INSERTION CATHETER ARTERY: CPT

## 2022-01-01 PROCEDURE — 87070 CULTURE OTHR SPECIMN AEROBIC: CPT

## 2022-01-01 PROCEDURE — 99291 CRITICAL CARE FIRST HOUR: CPT | Mod: GC | Performed by: INTERNAL MEDICINE

## 2022-01-01 PROCEDURE — 93325 DOPPLER ECHO COLOR FLOW MAPG: CPT | Mod: 26 | Performed by: INTERNAL MEDICINE

## 2022-01-01 PROCEDURE — 303105 HCHG CATHETER EXTRA

## 2022-01-01 PROCEDURE — 85610 PROTHROMBIN TIME: CPT | Mod: 91

## 2022-01-01 PROCEDURE — 93971 EXTREMITY STUDY: CPT | Mod: LT

## 2022-01-01 PROCEDURE — 93970 EXTREMITY STUDY: CPT

## 2022-01-01 PROCEDURE — 84145 PROCALCITONIN (PCT): CPT

## 2022-01-01 PROCEDURE — A9270 NON-COVERED ITEM OR SERVICE: HCPCS | Performed by: INTERNAL MEDICINE

## 2022-01-01 PROCEDURE — 99291 CRITICAL CARE FIRST HOUR: CPT | Performed by: STUDENT IN AN ORGANIZED HEALTH CARE EDUCATION/TRAINING PROGRAM

## 2022-01-01 PROCEDURE — 99291 CRITICAL CARE FIRST HOUR: CPT

## 2022-01-01 PROCEDURE — 99231 SBSQ HOSP IP/OBS SF/LOW 25: CPT | Mod: GC | Performed by: PSYCHIATRY & NEUROLOGY

## 2022-01-01 PROCEDURE — 665036 HSPC NOTICE OF ELECTION NOE

## 2022-01-01 PROCEDURE — 700105 HCHG RX REV CODE 258: Performed by: EMERGENCY MEDICINE

## 2022-01-01 PROCEDURE — 82330 ASSAY OF CALCIUM: CPT

## 2022-01-01 PROCEDURE — 85610 PROTHROMBIN TIME: CPT

## 2022-01-01 PROCEDURE — 86140 C-REACTIVE PROTEIN: CPT

## 2022-01-01 PROCEDURE — 83880 ASSAY OF NATRIURETIC PEPTIDE: CPT

## 2022-01-01 PROCEDURE — 99291 CRITICAL CARE FIRST HOUR: CPT | Mod: 25,GC | Performed by: INTERNAL MEDICINE

## 2022-01-01 PROCEDURE — 84443 ASSAY THYROID STIM HORMONE: CPT

## 2022-01-01 PROCEDURE — 93010 ELECTROCARDIOGRAM REPORT: CPT | Performed by: INTERNAL MEDICINE

## 2022-01-01 PROCEDURE — 93308 TTE F-UP OR LMTD: CPT

## 2022-01-01 PROCEDURE — 36556 INSERT NON-TUNNEL CV CATH: CPT

## 2022-01-01 PROCEDURE — 03HY32Z INSERTION OF MONITORING DEVICE INTO UPPER ARTERY, PERCUTANEOUS APPROACH: ICD-10-PCS | Performed by: INTERNAL MEDICINE

## 2022-01-01 PROCEDURE — 83605 ASSAY OF LACTIC ACID: CPT

## 2022-01-01 PROCEDURE — 85652 RBC SED RATE AUTOMATED: CPT

## 2022-01-01 PROCEDURE — 93931 UPPER EXTREMITY STUDY: CPT | Mod: LT

## 2022-01-01 PROCEDURE — 36415 COLL VENOUS BLD VENIPUNCTURE: CPT

## 2022-01-01 PROCEDURE — 93005 ELECTROCARDIOGRAM TRACING: CPT | Performed by: EMERGENCY MEDICINE

## 2022-01-01 PROCEDURE — 86901 BLOOD TYPING SEROLOGIC RH(D): CPT

## 2022-01-01 PROCEDURE — 84484 ASSAY OF TROPONIN QUANT: CPT

## 2022-01-01 PROCEDURE — 02HV33Z INSERTION OF INFUSION DEVICE INTO SUPERIOR VENA CAVA, PERCUTANEOUS APPROACH: ICD-10-PCS | Performed by: EMERGENCY MEDICINE

## 2022-01-01 PROCEDURE — 70553 MRI BRAIN STEM W/O & W/DYE: CPT

## 2022-01-01 PROCEDURE — 0042T CT-CEREBRAL PERFUSION ANALYSIS: CPT

## 2022-01-01 PROCEDURE — 92950 HEART/LUNG RESUSCITATION CPR: CPT

## 2022-01-01 RX ORDER — POLYETHYLENE GLYCOL 3350 17 G/17G
1 POWDER, FOR SOLUTION ORAL
Status: DISCONTINUED | OUTPATIENT
Start: 2022-01-01 | End: 2022-01-01

## 2022-01-01 RX ORDER — GLYCOPYRROLATE 1 MG/1
1 TABLET ORAL 3 TIMES DAILY PRN
Status: DISCONTINUED | OUTPATIENT
Start: 2022-01-01 | End: 2022-01-01 | Stop reason: HOSPADM

## 2022-01-01 RX ORDER — MORPHINE SULFATE 10 MG/ML
10 INJECTION, SOLUTION INTRAMUSCULAR; INTRAVENOUS
Status: DISCONTINUED | OUTPATIENT
Start: 2022-01-01 | End: 2022-01-01 | Stop reason: HOSPADM

## 2022-01-01 RX ORDER — GLYCOPYRROLATE 0.2 MG/ML
0.2 INJECTION INTRAMUSCULAR; INTRAVENOUS EVERY 4 HOURS PRN
Status: DISCONTINUED | OUTPATIENT
Start: 2022-01-01 | End: 2022-01-01 | Stop reason: HOSPADM

## 2022-01-01 RX ORDER — ATROPINE SULFATE 10 MG/ML
2 SOLUTION/ DROPS OPHTHALMIC
Status: DISCONTINUED | OUTPATIENT
Start: 2022-01-01 | End: 2022-01-01 | Stop reason: HOSPADM

## 2022-01-01 RX ORDER — NALOXONE HYDROCHLORIDE 0.4 MG/ML
INJECTION, SOLUTION INTRAMUSCULAR; INTRAVENOUS; SUBCUTANEOUS
Status: COMPLETED
Start: 2022-01-01 | End: 2022-01-01

## 2022-01-01 RX ORDER — ROCURONIUM BROMIDE 10 MG/ML
INJECTION, SOLUTION INTRAVENOUS
Status: COMPLETED | OUTPATIENT
Start: 2022-01-01 | End: 2022-01-01

## 2022-01-01 RX ORDER — SODIUM CHLORIDE, SODIUM LACTATE, POTASSIUM CHLORIDE, CALCIUM CHLORIDE 600; 310; 30; 20 MG/100ML; MG/100ML; MG/100ML; MG/100ML
1000 INJECTION, SOLUTION INTRAVENOUS ONCE
Status: DISCONTINUED | OUTPATIENT
Start: 2022-01-01 | End: 2022-01-01

## 2022-01-01 RX ORDER — LIDOCAINE HYDROCHLORIDE 20 MG/ML
20 INJECTION, SOLUTION INFILTRATION; PERINEURAL ONCE
Status: ACTIVE | OUTPATIENT
Start: 2022-01-01 | End: 2022-01-01

## 2022-01-01 RX ORDER — 3% SODIUM CHLORIDE 3 G/100ML
INJECTION, SOLUTION INTRAVENOUS CONTINUOUS
Status: DISCONTINUED | OUTPATIENT
Start: 2022-01-01 | End: 2022-01-01

## 2022-01-01 RX ORDER — ATROPINE SULFATE 10 MG/ML
2 SOLUTION/ DROPS OPHTHALMIC EVERY 4 HOURS PRN
Status: DISCONTINUED | OUTPATIENT
Start: 2022-01-01 | End: 2022-01-01 | Stop reason: HOSPADM

## 2022-01-01 RX ORDER — SODIUM CHLORIDE, SODIUM LACTATE, POTASSIUM CHLORIDE, CALCIUM CHLORIDE 600; 310; 30; 20 MG/100ML; MG/100ML; MG/100ML; MG/100ML
INJECTION, SOLUTION INTRAVENOUS CONTINUOUS
Status: DISCONTINUED | OUTPATIENT
Start: 2022-01-01 | End: 2022-01-01

## 2022-01-01 RX ORDER — SCOLOPAMINE TRANSDERMAL SYSTEM 1 MG/1
1 PATCH, EXTENDED RELEASE TRANSDERMAL
Status: DISCONTINUED | OUTPATIENT
Start: 2022-01-01 | End: 2022-01-01 | Stop reason: HOSPADM

## 2022-01-01 RX ORDER — SODIUM CHLORIDE, SODIUM LACTATE, POTASSIUM CHLORIDE, AND CALCIUM CHLORIDE .6; .31; .03; .02 G/100ML; G/100ML; G/100ML; G/100ML
1000 INJECTION, SOLUTION INTRAVENOUS ONCE
Status: COMPLETED | OUTPATIENT
Start: 2022-01-01 | End: 2022-01-01

## 2022-01-01 RX ORDER — HYDRALAZINE HYDROCHLORIDE 20 MG/ML
10 INJECTION INTRAMUSCULAR; INTRAVENOUS
Status: DISCONTINUED | OUTPATIENT
Start: 2022-01-01 | End: 2022-01-01

## 2022-01-01 RX ORDER — SODIUM CHLORIDE, SODIUM LACTATE, POTASSIUM CHLORIDE, CALCIUM CHLORIDE 600; 310; 30; 20 MG/100ML; MG/100ML; MG/100ML; MG/100ML
1000 INJECTION, SOLUTION INTRAVENOUS ONCE
Status: COMPLETED | OUTPATIENT
Start: 2022-01-01 | End: 2022-01-01

## 2022-01-01 RX ORDER — RIFAMPIN 300 MG/1
300 CAPSULE ORAL 2 TIMES DAILY
Status: DISCONTINUED | OUTPATIENT
Start: 2022-01-01 | End: 2022-01-01

## 2022-01-01 RX ORDER — MIDAZOLAM HYDROCHLORIDE 1 MG/ML
5 INJECTION INTRAMUSCULAR; INTRAVENOUS ONCE
Status: COMPLETED | OUTPATIENT
Start: 2022-01-01 | End: 2022-01-01

## 2022-01-01 RX ORDER — ONDANSETRON 2 MG/ML
4 INJECTION INTRAMUSCULAR; INTRAVENOUS EVERY 6 HOURS PRN
Status: DISCONTINUED | OUTPATIENT
Start: 2022-01-01 | End: 2022-01-01 | Stop reason: HOSPADM

## 2022-01-01 RX ORDER — DEXMEDETOMIDINE HYDROCHLORIDE 4 UG/ML
0-.7 INJECTION INTRAVENOUS CONTINUOUS
Status: DISCONTINUED | OUTPATIENT
Start: 2022-01-01 | End: 2022-01-01

## 2022-01-01 RX ORDER — ONDANSETRON 4 MG/1
4 TABLET, ORALLY DISINTEGRATING ORAL EVERY 6 HOURS PRN
Status: DISCONTINUED | OUTPATIENT
Start: 2022-01-01 | End: 2022-01-01 | Stop reason: HOSPADM

## 2022-01-01 RX ORDER — LORAZEPAM 2 MG/ML
1 CONCENTRATE ORAL
Status: DISCONTINUED | OUTPATIENT
Start: 2022-01-01 | End: 2022-01-01 | Stop reason: HOSPADM

## 2022-01-01 RX ORDER — MORPHINE SULFATE 10 MG/ML
5 INJECTION, SOLUTION INTRAMUSCULAR; INTRAVENOUS
Status: DISCONTINUED | OUTPATIENT
Start: 2022-01-01 | End: 2022-01-01 | Stop reason: HOSPADM

## 2022-01-01 RX ORDER — PIPERACILLIN SODIUM, TAZOBACTAM SODIUM 3; .375 G/15ML; G/15ML
INJECTION, POWDER, LYOPHILIZED, FOR SOLUTION INTRAVENOUS
Status: DISPENSED
Start: 2022-01-01 | End: 2022-01-01

## 2022-01-01 RX ORDER — DEXTROSE MONOHYDRATE 25 G/50ML
25 INJECTION, SOLUTION INTRAVENOUS
Status: DISCONTINUED | OUTPATIENT
Start: 2022-01-01 | End: 2022-01-01

## 2022-01-01 RX ORDER — LORAZEPAM 2 MG/ML
1 INJECTION INTRAMUSCULAR
Status: DISCONTINUED | OUTPATIENT
Start: 2022-01-01 | End: 2022-01-01 | Stop reason: HOSPADM

## 2022-01-01 RX ORDER — FAMOTIDINE 20 MG/1
20 TABLET, FILM COATED ORAL EVERY 12 HOURS
Status: DISCONTINUED | OUTPATIENT
Start: 2022-01-01 | End: 2022-01-01

## 2022-01-01 RX ORDER — GLYCOPYRROLATE 0.2 MG/ML
0.2 INJECTION INTRAMUSCULAR; INTRAVENOUS 3 TIMES DAILY PRN
Status: DISCONTINUED | OUTPATIENT
Start: 2022-01-01 | End: 2022-01-01 | Stop reason: HOSPADM

## 2022-01-01 RX ORDER — KETAMINE HYDROCHLORIDE 50 MG/ML
INJECTION, SOLUTION INTRAMUSCULAR; INTRAVENOUS
Status: COMPLETED | OUTPATIENT
Start: 2022-01-01 | End: 2022-01-01

## 2022-01-01 RX ORDER — HEPARIN SODIUM 1000 [USP'U]/ML
40 INJECTION, SOLUTION INTRAVENOUS; SUBCUTANEOUS PRN
Status: DISCONTINUED | OUTPATIENT
Start: 2022-01-01 | End: 2022-01-01

## 2022-01-01 RX ORDER — AMOXICILLIN 250 MG
2 CAPSULE ORAL 2 TIMES DAILY
Status: DISCONTINUED | OUTPATIENT
Start: 2022-01-01 | End: 2022-01-01

## 2022-01-01 RX ORDER — HEPARIN SODIUM 5000 [USP'U]/100ML
0-30 INJECTION, SOLUTION INTRAVENOUS CONTINUOUS
Status: DISCONTINUED | OUTPATIENT
Start: 2022-01-01 | End: 2022-01-01

## 2022-01-01 RX ORDER — NOREPINEPHRINE BITARTRATE 0.03 MG/ML
0-30 INJECTION, SOLUTION INTRAVENOUS CONTINUOUS
Status: DISCONTINUED | OUTPATIENT
Start: 2022-01-01 | End: 2022-01-01

## 2022-01-01 RX ORDER — NALOXONE HYDROCHLORIDE 1 MG/ML
INJECTION INTRAMUSCULAR; INTRAVENOUS; SUBCUTANEOUS
Status: DISPENSED
Start: 2022-01-01 | End: 2022-01-01

## 2022-01-01 RX ORDER — SODIUM CHLORIDE 9 MG/ML
INJECTION, SOLUTION INTRAVENOUS
Status: DISPENSED
Start: 2022-01-01 | End: 2022-01-01

## 2022-01-01 RX ORDER — 3% SODIUM CHLORIDE 3 G/100ML
250 INJECTION, SOLUTION INTRAVENOUS ONCE
Status: COMPLETED | OUTPATIENT
Start: 2022-01-01 | End: 2022-01-01

## 2022-01-01 RX ORDER — KETAMINE HYDROCHLORIDE 50 MG/ML
INJECTION, SOLUTION INTRAMUSCULAR; INTRAVENOUS
Status: DISPENSED
Start: 2022-01-01 | End: 2022-01-01

## 2022-01-01 RX ORDER — HEPARIN SODIUM 1000 [USP'U]/ML
80 INJECTION, SOLUTION INTRAVENOUS; SUBCUTANEOUS ONCE
Status: COMPLETED | OUTPATIENT
Start: 2022-01-01 | End: 2022-01-01

## 2022-01-01 RX ORDER — BISACODYL 10 MG
10 SUPPOSITORY, RECTAL RECTAL
Status: DISCONTINUED | OUTPATIENT
Start: 2022-01-01 | End: 2022-01-01

## 2022-01-01 RX ADMIN — MORPHINE SULFATE 10 MG: 10 INJECTION INTRAVENOUS at 00:25

## 2022-01-01 RX ADMIN — Medication 200 MCG: at 11:40

## 2022-01-01 RX ADMIN — RIFAMPIN 300 MG: 300 CAPSULE ORAL at 18:28

## 2022-01-01 RX ADMIN — NOREPINEPHRINE BITARTRATE 3 MCG/MIN: 1 INJECTION INTRAVENOUS at 21:49

## 2022-01-01 RX ADMIN — SODIUM CHLORIDE: 3 INJECTION, SOLUTION INTRAVENOUS at 03:17

## 2022-01-01 RX ADMIN — SODIUM CHLORIDE: 3 INJECTION, SOLUTION INTRAVENOUS at 05:15

## 2022-01-01 RX ADMIN — SCOPALAMINE 1 PATCH: 1 PATCH, EXTENDED RELEASE TRANSDERMAL at 11:00

## 2022-01-01 RX ADMIN — NOREPINEPHRINE BITARTRATE 5 MCG/MIN: 1 INJECTION INTRAVENOUS at 23:40

## 2022-01-01 RX ADMIN — CEFEPIME 2 G: 2 INJECTION, POWDER, FOR SOLUTION INTRAVENOUS at 01:14

## 2022-01-01 RX ADMIN — MORPHINE SULFATE 10 MG: 10 INJECTION INTRAVENOUS at 16:13

## 2022-01-01 RX ADMIN — VANCOMYCIN HYDROCHLORIDE 750 MG: 500 INJECTION, POWDER, LYOPHILIZED, FOR SOLUTION INTRAVENOUS at 00:58

## 2022-01-01 RX ADMIN — NALOXONE HYDROCHLORIDE 0.4 MG: 0.4 INJECTION, SOLUTION INTRAMUSCULAR; INTRAVENOUS; SUBCUTANEOUS at 22:42

## 2022-01-01 RX ADMIN — FENTANYL CITRATE 50 MCG: 50 INJECTION INTRAMUSCULAR; INTRAVENOUS at 11:51

## 2022-01-01 RX ADMIN — MORPHINE SULFATE 10 MG: 10 INJECTION INTRAVENOUS at 19:03

## 2022-01-01 RX ADMIN — LORAZEPAM 1 MG: 2 INJECTION INTRAMUSCULAR; INTRAVENOUS at 02:07

## 2022-01-01 RX ADMIN — DEXMEDETOMIDINE 0.3 MCG/KG/HR: 200 INJECTION, SOLUTION INTRAVENOUS at 05:52

## 2022-01-01 RX ADMIN — MORPHINE SULFATE 10 MG: 10 INJECTION INTRAVENOUS at 17:24

## 2022-01-01 RX ADMIN — HEPARIN SODIUM 18 UNITS/KG/HR: 5000 INJECTION, SOLUTION INTRAVENOUS at 01:05

## 2022-01-01 RX ADMIN — Medication 150 MCG/HR: at 14:25

## 2022-01-01 RX ADMIN — ROCURONIUM BROMIDE 100 MG: 10 INJECTION, SOLUTION INTRAVENOUS at 23:14

## 2022-01-01 RX ADMIN — FAMOTIDINE 20 MG: 10 INJECTION, SOLUTION INTRAVENOUS at 05:14

## 2022-01-01 RX ADMIN — CEFEPIME 2 G: 2 INJECTION, POWDER, FOR SOLUTION INTRAVENOUS at 05:14

## 2022-01-01 RX ADMIN — SODIUM CHLORIDE, POTASSIUM CHLORIDE, SODIUM LACTATE AND CALCIUM CHLORIDE 1000 ML: 600; 310; 30; 20 INJECTION, SOLUTION INTRAVENOUS at 23:15

## 2022-01-01 RX ADMIN — HUMAN ALBUMIN MICROSPHERES AND PERFLUTREN 3 ML: 10; .22 INJECTION, SOLUTION INTRAVENOUS at 08:32

## 2022-01-01 RX ADMIN — FAMOTIDINE 20 MG: 20 TABLET ORAL at 18:28

## 2022-01-01 RX ADMIN — SODIUM CHLORIDE: 3 INJECTION, SOLUTION INTRAVENOUS at 10:56

## 2022-01-01 RX ADMIN — Medication 200 MCG/HR: at 15:01

## 2022-01-01 RX ADMIN — LORAZEPAM 1 MG: 2 INJECTION INTRAMUSCULAR; INTRAVENOUS at 17:24

## 2022-01-01 RX ADMIN — PIPERACILLIN AND TAZOBACTAM 3.38 G: 3; .375 INJECTION, POWDER, LYOPHILIZED, FOR SOLUTION INTRAVENOUS; PARENTERAL at 23:06

## 2022-01-01 RX ADMIN — HEPARIN SODIUM 7500 UNITS: 1000 INJECTION, SOLUTION INTRAVENOUS; SUBCUTANEOUS at 01:04

## 2022-01-01 RX ADMIN — MIDAZOLAM 1 MG/HR: 5 INJECTION INTRAMUSCULAR; INTRAVENOUS at 14:57

## 2022-01-01 RX ADMIN — Medication 200 MCG/HR: at 20:10

## 2022-01-01 RX ADMIN — SODIUM CHLORIDE, POTASSIUM CHLORIDE, SODIUM LACTATE AND CALCIUM CHLORIDE: 600; 310; 30; 20 INJECTION, SOLUTION INTRAVENOUS at 01:47

## 2022-01-01 RX ADMIN — SODIUM CHLORIDE: 3 INJECTION, SOLUTION INTRAVENOUS at 21:53

## 2022-01-01 RX ADMIN — SENNOSIDES AND DOCUSATE SODIUM 2 TABLET: 50; 8.6 TABLET ORAL at 06:10

## 2022-01-01 RX ADMIN — SENNOSIDES AND DOCUSATE SODIUM 2 TABLET: 50; 8.6 TABLET ORAL at 05:50

## 2022-01-01 RX ADMIN — MIDAZOLAM HYDROCHLORIDE 5 MG: 1 INJECTION, SOLUTION INTRAMUSCULAR; INTRAVENOUS at 23:25

## 2022-01-01 RX ADMIN — FAMOTIDINE 20 MG: 20 TABLET ORAL at 05:08

## 2022-01-01 RX ADMIN — SODIUM ACETATE: 164 INJECTION, SOLUTION, CONCENTRATE INTRAVENOUS at 12:01

## 2022-01-01 RX ADMIN — FAMOTIDINE 20 MG: 20 TABLET ORAL at 17:08

## 2022-01-01 RX ADMIN — IOHEXOL 80 ML: 350 INJECTION, SOLUTION INTRAVENOUS at 03:15

## 2022-01-01 RX ADMIN — DEXMEDETOMIDINE 0.5 MCG/KG/HR: 200 INJECTION, SOLUTION INTRAVENOUS at 12:39

## 2022-01-01 RX ADMIN — LORAZEPAM 1 MG: 2 INJECTION INTRAMUSCULAR; INTRAVENOUS at 13:03

## 2022-01-01 RX ADMIN — SODIUM CHLORIDE: 3 INJECTION, SOLUTION INTRAVENOUS at 10:31

## 2022-01-01 RX ADMIN — SODIUM CHLORIDE 250 ML: 3 INJECTION, SOLUTION INTRAVENOUS at 04:45

## 2022-01-01 RX ADMIN — CEFEPIME 2 G: 2 INJECTION, POWDER, FOR SOLUTION INTRAVENOUS at 23:01

## 2022-01-01 RX ADMIN — CEFEPIME 2 G: 2 INJECTION, POWDER, FOR SOLUTION INTRAVENOUS at 16:01

## 2022-01-01 RX ADMIN — Medication 100 MCG/HR: at 21:50

## 2022-01-01 RX ADMIN — FENTANYL CITRATE 100 MCG: 50 INJECTION, SOLUTION INTRAMUSCULAR; INTRAVENOUS at 07:07

## 2022-01-01 RX ADMIN — DEXMEDETOMIDINE 0.3 MCG/KG/HR: 200 INJECTION, SOLUTION INTRAVENOUS at 03:13

## 2022-01-01 RX ADMIN — SENNOSIDES AND DOCUSATE SODIUM 2 TABLET: 50; 8.6 TABLET ORAL at 17:08

## 2022-01-01 RX ADMIN — Medication 100 MCG/HR: at 04:56

## 2022-01-01 RX ADMIN — CEFEPIME 2 G: 2 INJECTION, POWDER, FOR SOLUTION INTRAVENOUS at 05:08

## 2022-01-01 RX ADMIN — DEXMEDETOMIDINE 0.3 MCG/KG/HR: 200 INJECTION, SOLUTION INTRAVENOUS at 22:55

## 2022-01-01 RX ADMIN — Medication 200 MCG/HR: at 07:44

## 2022-01-01 RX ADMIN — FAMOTIDINE 20 MG: 20 TABLET ORAL at 06:11

## 2022-01-01 RX ADMIN — VANCOMYCIN HYDROCHLORIDE 750 MG: 500 INJECTION, POWDER, LYOPHILIZED, FOR SOLUTION INTRAVENOUS at 13:42

## 2022-01-01 RX ADMIN — Medication 200 MCG: at 07:38

## 2022-01-01 RX ADMIN — Medication 150 MCG/HR: at 11:53

## 2022-01-01 RX ADMIN — MORPHINE SULFATE 10 MG: 10 INJECTION INTRAVENOUS at 02:06

## 2022-01-01 RX ADMIN — CEFEPIME 2 G: 2 INJECTION, POWDER, FOR SOLUTION INTRAVENOUS at 13:00

## 2022-01-01 RX ADMIN — DEXMEDETOMIDINE 0.5 MCG/KG/HR: 200 INJECTION, SOLUTION INTRAVENOUS at 19:47

## 2022-01-01 RX ADMIN — VANCOMYCIN HYDROCHLORIDE 750 MG: 500 INJECTION, POWDER, LYOPHILIZED, FOR SOLUTION INTRAVENOUS at 01:51

## 2022-01-01 RX ADMIN — DEXMEDETOMIDINE 0.3 MCG/KG/HR: 200 INJECTION, SOLUTION INTRAVENOUS at 12:16

## 2022-01-01 RX ADMIN — KETAMINE HYDROCHLORIDE 100 MG: 50 INJECTION INTRAMUSCULAR; INTRAVENOUS at 23:14

## 2022-01-01 RX ADMIN — CEFEPIME 2 G: 2 INJECTION, POWDER, FOR SOLUTION INTRAVENOUS at 13:47

## 2022-01-01 RX ADMIN — DEXMEDETOMIDINE 0.2 MCG/KG/HR: 200 INJECTION, SOLUTION INTRAVENOUS at 05:28

## 2022-01-01 RX ADMIN — SODIUM CHLORIDE, POTASSIUM CHLORIDE, SODIUM LACTATE AND CALCIUM CHLORIDE 1000 ML: 600; 310; 30; 20 INJECTION, SOLUTION INTRAVENOUS at 00:15

## 2022-01-01 RX ADMIN — PROPOFOL 5 MCG/KG/MIN: 10 INJECTION, EMULSION INTRAVENOUS at 01:18

## 2022-01-01 RX ADMIN — DEXMEDETOMIDINE 0.3 MCG/KG/HR: 200 INJECTION, SOLUTION INTRAVENOUS at 11:54

## 2022-01-01 RX ADMIN — HEPARIN SODIUM 18 UNITS/KG/HR: 5000 INJECTION, SOLUTION INTRAVENOUS at 21:51

## 2022-01-01 RX ADMIN — LORAZEPAM 1 MG: 2 INJECTION INTRAMUSCULAR; INTRAVENOUS at 14:07

## 2022-01-01 RX ADMIN — Medication 200 MCG/HR: at 19:13

## 2022-01-01 RX ADMIN — INSULIN HUMAN 1 UNITS: 100 INJECTION, SOLUTION PARENTERAL at 06:19

## 2022-01-01 RX ADMIN — CEFEPIME 2 G: 2 INJECTION, POWDER, FOR SOLUTION INTRAVENOUS at 05:37

## 2022-01-01 RX ADMIN — LORAZEPAM 1 MG: 2 INJECTION INTRAMUSCULAR; INTRAVENOUS at 00:25

## 2022-01-01 RX ADMIN — LORAZEPAM 1 MG: 2 INJECTION INTRAMUSCULAR; INTRAVENOUS at 14:58

## 2022-01-01 RX ADMIN — GADOTERIDOL 20 ML: 279.3 INJECTION, SOLUTION INTRAVENOUS at 03:54

## 2022-01-01 RX ADMIN — MORPHINE SULFATE 10 MG: 10 INJECTION INTRAVENOUS at 20:55

## 2022-01-01 RX ADMIN — NOREPINEPHRINE BITARTRATE 2 MCG/MIN: 1 INJECTION INTRAVENOUS at 18:27

## 2022-01-01 RX ADMIN — IOHEXOL 40 ML: 350 INJECTION, SOLUTION INTRAVENOUS at 03:15

## 2022-01-01 RX ADMIN — FENTANYL CITRATE 50 MCG: 50 INJECTION, SOLUTION INTRAMUSCULAR; INTRAVENOUS at 19:34

## 2022-01-01 RX ADMIN — HEPARIN SODIUM 18 UNITS/KG/HR: 5000 INJECTION, SOLUTION INTRAVENOUS at 03:12

## 2022-01-01 RX ADMIN — SCOPALAMINE 1 PATCH: 1 PATCH, EXTENDED RELEASE TRANSDERMAL at 15:00

## 2022-01-01 RX ADMIN — FAMOTIDINE 20 MG: 10 INJECTION, SOLUTION INTRAVENOUS at 18:06

## 2022-01-01 RX ADMIN — HEPARIN SODIUM 18 UNITS/KG/HR: 5000 INJECTION, SOLUTION INTRAVENOUS at 18:27

## 2022-01-01 RX ADMIN — HEPARIN SODIUM 18 UNITS/KG/HR: 5000 INJECTION, SOLUTION INTRAVENOUS at 13:51

## 2022-01-01 RX ADMIN — CEFEPIME 2 G: 2 INJECTION, POWDER, FOR SOLUTION INTRAVENOUS at 20:58

## 2022-01-01 RX ADMIN — VANCOMYCIN HYDROCHLORIDE 750 MG: 500 INJECTION, POWDER, LYOPHILIZED, FOR SOLUTION INTRAVENOUS at 14:14

## 2022-01-01 RX ADMIN — SODIUM CHLORIDE, POTASSIUM CHLORIDE, SODIUM LACTATE AND CALCIUM CHLORIDE: 600; 310; 30; 20 INJECTION, SOLUTION INTRAVENOUS at 12:22

## 2022-01-01 RX ADMIN — Medication 300 MCG/HR: at 01:05

## 2022-01-01 RX ADMIN — VANCOMYCIN HYDROCHLORIDE 750 MG: 500 INJECTION, POWDER, LYOPHILIZED, FOR SOLUTION INTRAVENOUS at 13:00

## 2022-01-01 RX ADMIN — FAMOTIDINE 20 MG: 20 TABLET ORAL at 05:36

## 2022-01-01 RX ADMIN — IOHEXOL 100 ML: 350 INJECTION, SOLUTION INTRAVENOUS at 00:42

## 2022-01-01 RX ADMIN — CEFEPIME 2 G: 2 INJECTION, POWDER, FOR SOLUTION INTRAVENOUS at 06:10

## 2022-01-01 RX ADMIN — FAMOTIDINE 20 MG: 20 TABLET ORAL at 05:50

## 2022-01-01 RX ADMIN — CEFEPIME 2 G: 2 INJECTION, POWDER, FOR SOLUTION INTRAVENOUS at 22:54

## 2022-01-01 RX ADMIN — Medication 200 MCG/HR: at 16:35

## 2022-01-01 RX ADMIN — DEXMEDETOMIDINE 0.3 MCG/KG/HR: 200 INJECTION, SOLUTION INTRAVENOUS at 08:24

## 2022-01-01 RX ADMIN — CEFEPIME 2 G: 2 INJECTION, POWDER, FOR SOLUTION INTRAVENOUS at 22:47

## 2022-01-01 RX ADMIN — SODIUM CHLORIDE, POTASSIUM CHLORIDE, SODIUM LACTATE AND CALCIUM CHLORIDE 1000 ML: 600; 310; 30; 20 INJECTION, SOLUTION INTRAVENOUS at 01:21

## 2022-01-01 RX ADMIN — HEPARIN SODIUM 18 UNITS/KG/HR: 5000 INJECTION, SOLUTION INTRAVENOUS at 04:59

## 2022-01-01 RX ADMIN — VANCOMYCIN HYDROCHLORIDE 2750 MG: 500 INJECTION, POWDER, LYOPHILIZED, FOR SOLUTION INTRAVENOUS at 01:15

## 2022-01-01 RX ADMIN — SENNOSIDES AND DOCUSATE SODIUM 2 TABLET: 50; 8.6 TABLET ORAL at 05:08

## 2022-01-01 RX ADMIN — MORPHINE SULFATE 10 MG: 10 INJECTION INTRAVENOUS at 12:13

## 2022-01-01 RX ADMIN — LORAZEPAM 1 MG: 2 INJECTION INTRAMUSCULAR; INTRAVENOUS at 19:04

## 2022-01-01 RX ADMIN — DEXMEDETOMIDINE 0.3 MCG/KG/HR: 200 INJECTION, SOLUTION INTRAVENOUS at 04:18

## 2022-01-01 RX ADMIN — MORPHINE SULFATE 10 MG: 10 INJECTION INTRAVENOUS at 14:07

## 2022-01-01 RX ADMIN — LORAZEPAM 1 MG: 2 INJECTION INTRAMUSCULAR; INTRAVENOUS at 11:00

## 2022-01-01 RX ADMIN — RIFAMPIN 300 MG: 300 CAPSULE ORAL at 05:36

## 2022-01-01 RX ADMIN — SODIUM CHLORIDE: 3 INJECTION, SOLUTION INTRAVENOUS at 19:08

## 2022-01-01 RX ADMIN — Medication 200 MCG/HR: at 01:48

## 2022-01-01 RX ADMIN — RIFAMPIN 300 MG: 300 CAPSULE ORAL at 05:51

## 2022-01-01 SDOH — ECONOMIC STABILITY: GENERAL

## 2022-01-01 ASSESSMENT — PAIN SCALES - PAIN ASSESSMENT IN ADVANCED DEMENTIA (PAINAD)
CONSOLABILITY: 1 - DISTRACTED OR REASSURED BY VOICE OR TOUCH.
TOTALSCORE: 3
NEGVOCALIZATION: 0 - NONE.
FACIALEXPRESSION: 0 - SMILING OR INEXPRESSIVE.
BODYLANGUAGE: 1 - TENSE. DISTRESSED PACING. FIDGETING.

## 2022-01-01 ASSESSMENT — PAIN DESCRIPTION - PAIN TYPE
TYPE: ACUTE PAIN

## 2022-01-01 ASSESSMENT — COGNITIVE AND FUNCTIONAL STATUS - GENERAL
MOVING FROM LYING ON BACK TO SITTING ON SIDE OF FLAT BED: UNABLE
DAILY ACTIVITIY SCORE: 6
DRESSING REGULAR LOWER BODY CLOTHING: TOTAL
WALKING IN HOSPITAL ROOM: TOTAL
SUGGESTED CMS G CODE MODIFIER MOBILITY: CN
HELP NEEDED FOR BATHING: TOTAL
TURNING FROM BACK TO SIDE WHILE IN FLAT BAD: UNABLE
PERSONAL GROOMING: TOTAL
TOILETING: TOTAL
CLIMB 3 TO 5 STEPS WITH RAILING: TOTAL
SUGGESTED CMS G CODE MODIFIER DAILY ACTIVITY: CN
DRESSING REGULAR UPPER BODY CLOTHING: TOTAL
MOBILITY SCORE: 6
STANDING UP FROM CHAIR USING ARMS: TOTAL
EATING MEALS: TOTAL
MOVING TO AND FROM BED TO CHAIR: UNABLE

## 2022-01-01 ASSESSMENT — ENCOUNTER SYMPTOMS
DYSPNEA ACTIVITY LEVEL: AT REST
DYSPNEA ON EXERTION: 1
SHORTNESS OF BREATH: 1
CONSTIPATION: 1
PAIN LOCATION - PAIN SEVERITY: 3/10
STOOL FREQUENCY: LESS THAN DAILY
FATIGUES EASILY: 1
UNABLE TO COMMUNICATE PAIN: 1
CHANGE IN LEVEL OF CONSCIOUSNESS: 1
BOWEL INCONTINENCE: 1
PERSON REPORTING PAIN: DIRECT OBSERVATION
PAIN LOCATION: GENERALIZED

## 2022-01-01 ASSESSMENT — ACTIVITIES OF DAILY LIVING (ADL)
AMBULATION_REQUIRES_ASSISTANCE: 1
PHYSICAL_TRANSFER_REQUIRES_ASSISTANCE: 1
MONEY MANAGEMENT (EXPENSES/BILLS): TOTALLY DEPENDENT
DRESSING_REQUIRES_ASSISTANCE: 1
BATHING_REQUIRES_ASSISTANCE: 1
EATING_REQUIRES_ASSISTANCE: 1
CONTINENCE_REQUIRES_ASSISTANCE: 1

## 2022-01-01 ASSESSMENT — LIFESTYLE VARIABLES
AVERAGE NUMBER OF DAYS PER WEEK YOU HAVE A DRINK CONTAINING ALCOHOL: 0
HAVE PEOPLE ANNOYED YOU BY CRITICIZING YOUR DRINKING: NO
DO YOU DRINK ALCOHOL: NO
HOW MANY TIMES IN THE PAST YEAR HAVE YOU HAD 5 OR MORE DRINKS IN A DAY: 0
HAVE YOU EVER FELT YOU SHOULD CUT DOWN ON YOUR DRINKING: NO
TOTAL SCORE: 0
TOTAL SCORE: 0
ON A TYPICAL DAY WHEN YOU DRINK ALCOHOL HOW MANY DRINKS DO YOU HAVE: 0
EVER FELT BAD OR GUILTY ABOUT YOUR DRINKING: NO
CONSUMPTION TOTAL: NEGATIVE
TOTAL SCORE: 0
EVER HAD A DRINK FIRST THING IN THE MORNING TO STEADY YOUR NERVES TO GET RID OF A HANGOVER: NO

## 2022-01-01 ASSESSMENT — PATIENT HEALTH QUESTIONNAIRE - PHQ9: CLINICAL INTERPRETATION OF PHQ2 SCORE: 0

## 2022-01-01 ASSESSMENT — FIBROSIS 4 INDEX
FIB4 SCORE: 1.12
FIB4 SCORE: 2.15
FIB4 SCORE: 1.66

## 2022-03-04 PROBLEM — J96.02 ACUTE HYPERCAPNIC RESPIRATORY FAILURE (HCC): Status: ACTIVE | Noted: 2022-01-01

## 2022-03-05 PROBLEM — R65.21 SEPTIC SHOCK (HCC): Status: ACTIVE | Noted: 2022-01-01

## 2022-03-05 PROBLEM — I76 SEPTIC EMBOLISM (HCC): Status: ACTIVE | Noted: 2022-01-01

## 2022-03-05 PROBLEM — I63.9 CEREBROVASCULAR ACCIDENT (CVA) DUE TO EMBOLISM (HCC): Status: ACTIVE | Noted: 2022-01-01

## 2022-03-05 PROBLEM — I74.10 AORTIC THROMBUS (HCC): Status: ACTIVE | Noted: 2022-01-01

## 2022-03-05 PROBLEM — T84.7XXS WOUND INFECTION COMPLICATING HARDWARE, SEQUELA: Status: ACTIVE | Noted: 2022-01-01

## 2022-03-05 PROBLEM — N28.0 RENAL INFARCT (HCC): Status: ACTIVE | Noted: 2022-01-01

## 2022-03-05 PROBLEM — D73.5 SPLENIC INFARCT: Status: ACTIVE | Noted: 2022-01-01

## 2022-03-05 PROBLEM — J96.01 ACUTE RESPIRATORY FAILURE WITH HYPOXIA (HCC): Status: ACTIVE | Noted: 2022-01-01

## 2022-03-05 PROBLEM — A41.9 SEPTIC SHOCK (HCC): Status: ACTIVE | Noted: 2022-01-01

## 2022-03-05 PROBLEM — I26.99 PULMONARY EMBOLISM (HCC): Status: ACTIVE | Noted: 2022-01-01

## 2022-03-05 PROBLEM — Z98.1 S/P LUMBAR FUSION: Status: ACTIVE | Noted: 2022-01-01

## 2022-03-05 PROBLEM — G93.40 ENCEPHALOPATHY: Status: ACTIVE | Noted: 2022-01-01

## 2022-03-05 NOTE — CONSULTS
Chief Complaint   Patient presents with   • ALOC     Pt BIBA from home after being altered per wife for the last hour. Pt found to be 86% on RA and was placed on 6 L n/c. [facility] and had post-op complication and placed on antibiotics which he's been compliant with. Pt responsive to painful stimuli on arrival. GCS 9. . Pt BP 90/57 on arrival.      Neurology Consultation     History of present illness:  This is a 76-year old male with PMHx significant for Lumbar fusion in November, complicated by MSSA surgical site incision of the lumbar spine requiring washout in December 2021 and MSSA bacteremia with ischemic stroke secondary to septic emboli (without endocarditis seen on SALVATORE at that time ) in January 2022, also with  hypertension and hyperlipidemia who again presented to [facility] on 3/5/22 for a chief complaint of altered mental status and hypoxia. The patient had been discharged [facility]; had been doing reasonably well. Over the past 48 hours, the patient had poor PO intake, was poorly interactive, thus EMS called. On arrival here, BG WNL; patient was noted to be mildly hypotensive (90/57); neurotrophilia with WBC 14.3 concerning for sepsis; was admitted to ICU for further care.     Overnight, patient reportedly became more somnolent/less responsive. Stroke Alert called. Stat CT head revealed question of hypodensities scattered about the Left hemisphere (left CAROL ANN/MCA territories) concerning for evolving acute ischemic stroke. MRI Brain wo contrast was obtained and confirmed these findings; no hemorrhagic conversion; Patient was also found to have large/extensive PE; CTA also demonstrated thrombus within the perivisceral aorta with associated splenic and renal artery infarcts. Patient has thus been started on Heparin gtt.     Currently, patient is laying in bed; he is intubated/sedated. Does not follow commands, no  spontaneous or purposeful motor activity. Further ROS is limited.     Neurology has been consulted by Dr. Richardson Workman to further evaluate the findings noted above.      Past medical history:   History reviewed. No pertinent past medical history.    Past surgical history:   History reviewed. No pertinent surgical history.    Family history:   History reviewed. No pertinent family history.    Social history:   Social History     Socioeconomic History   • Marital status: Not on file     Spouse name: Not on file   • Number of children: Not on file   • Years of education: Not on file   • Highest education level: Not on file   Occupational History   • Not on file   Tobacco Use   • Smoking status: Unknown If Ever Smoked   • Smokeless tobacco: Not on file   Substance and Sexual Activity   • Alcohol use: Not on file   • Drug use: Not on file   • Sexual activity: Not on file   Other Topics Concern   • Not on file   Social History Narrative   • Not on file     Social Determinants of Health     Financial Resource Strain: Not on file   Food Insecurity: Not on file   Transportation Needs: Not on file   Physical Activity: Not on file   Stress: Not on file   Social Connections: Not on file   Intimate Partner Violence: Not on file   Housing Stability: Not on file       Current medications:   Current Facility-Administered Medications   Medication Dose   • MD Alert...Vancomycin per Pharmacy     • cefepime (Maxipime) 2 g in dextrose 5% 20 mL IV syringe  2 g   • vancomycin (VANCOCIN) 750 mg in  mL IVPB  750 mg   • Respiratory Therapy Consult     • famotidine (PEPCID) tablet 20 mg  20 mg    Or   • famotidine (PEPCID) injection 20 mg  20 mg   • senna-docusate (PERICOLACE or SENOKOT S) 8.6-50 MG per tablet 2 Tablet  2 Tablet    And   • polyethylene glycol/lytes (MIRALAX) PACKET 1 Packet  1 Packet    And   • magnesium hydroxide (MILK OF MAGNESIA) suspension 30 mL  30 mL    And   • bisacodyl (DULCOLAX) suppository 10 mg  10 mg   • MD  Alert...ICU Electrolyte Replacement per Pharmacy     • lidocaine (XYLOCAINE) 1 % injection 2 mL  2 mL   • fentaNYL (SUBLIMAZE) injection 50 mcg  50 mcg    And   • fentaNYL (SUBLIMAZE) injection 100 mcg  100 mcg    And   • fentaNYL (SUBLIMAZE) 50 mcg/mL in 50mL (Continuous Infusion)      And   • dexmedetomidine (Precedex) 400 mcg/100mL D5W premix infusion  0-0.7 mcg/kg/hr   • insulin regular (HumuLIN R,NovoLIN R) injection  1-6 Units    And   • dextrose 50% (D50W) injection 25 g  25 g   • heparin infusion 25,000 units in 500 mL 0.45% NACL  0-30 Units/kg/hr   • Allow for permissive hypertension: SBP up to 220 mmHg/DBP to 120 mmHg; If SBP greater than 220 mmHg or DBP greater than 120 mmHg administer PRN antihypertensive medications.     • hydrALAZINE (APRESOLINE) injection 10 mg  10 mg   • NALOXONE HCL 2 MG/2ML INJ SOSY     • lidocaine (XYLOCAINE) 2 % injection 20 mL  20 mL   • PIPERACILLIN SOD-TAZOBACTAM SO 3.375 (3-0.375) G IV SOLR     • SODIUM CHLORIDE 0.9 % IV SOLN     • KETAMINE HCL 50 MG/ML INJ SOLN     • norepinephrine (Levophed) 8 mg in 250 mL NS infusion (premix)  0-30 mcg/min       Medication Allergy:  No Known Allergies    Review of systems:   Limited as patient is intubated/sedated.       Physical examination:   Vitals:    03/05/22 0615 03/05/22 0630 03/05/22 0645 03/05/22 0700   BP: 115/76 114/77 114/78 124/80   Pulse: 66 65 64 61   Resp: 20 (!) 26 (!) 28 (!) 25   Temp:       TempSrc:       SpO2: 100% 99% 99% 98%   Weight:       Height:         General: Patient in no acute distress, pleasant and cooperative.  HEENT: Normocephalic, no signs of acute trauma.   Neck: supple, no meningeal signs or carotid bruits. There is normal range of motion. No tenderness on exam.   Chest: clear to auscultation. No cough.   CV: RRR, no murmurs.   Skin: no signs of acute rashes or trauma.   Musculoskeletal: joints exhibit full range of motion, without any pain to palpation. There are no signs of joint or muscle swelling.  There is no tenderness to deep palpation of muscles.   Psychiatric: No hallucinatory behavior. Denies symptoms of depression or suicidal ideation. Mood and affect appear normal on exam.     NEUROLOGICAL EXAM:   Mental status, orientation: Intubated, comatose.   Speech and language: No verbal output; does not follow commands.   Cranial nerve exam: Pupils are 2 mm bilaterally and equally reactive to light. Eyes midline. Does not blink to visual threat. Does not track spontaneously nor to command. Cough/gag/corneal reflexes appear to be intact. Occulocephalics intact. Face appears symmetric.  Tongue is midline and without any signs of tongue biting or fasciculations.  Motor exam: No spontaneous or purposeful motor activity. No abnormal movements were seen on exam.   Sensory exam Does not withdrawal to deep nox stim.  Deep tendon reflexes: Plantar responses are mute. There is no clonus.   Coordination: Deferred, patient does not participate.   Gait: Not assessed at this time as patient is currently unable.      NIH Stroke Scale    1a Level of Consciousness 2  1b Orientation Questions 2  1c Response to Commands 2  2 Gaze   3 Visual Fields   4 Facial Movement   5 Motor Function (arm)   a Left 4  b Right 4  6 Motor Function (leg)   a Left 4  b Right 4  7 Limb Ataxia   8 Sensory 2  9 Language 3  10 Articulation 1  11 Extinction/Inattention     Score: 30      ANCILLARY DATA REVIEWED:     Lab Data Review:  Recent Results (from the past 24 hour(s))   POCT glucose device results    Collection Time: 03/04/22 10:43 PM   Result Value Ref Range    POC Glucose, Blood 193 (H) 65 - 99 mg/dL   LACTIC ACID    Collection Time: 03/04/22 10:47 PM   Result Value Ref Range    Lactic Acid 2.3 (H) 0.5 - 2.0 mmol/L   CBC WITH DIFFERENTIAL    Collection Time: 03/04/22 10:47 PM   Result Value Ref Range    WBC 14.3 (H) 4.8 - 10.8 K/uL    RBC 3.61 (L) 4.70 - 6.10 M/uL    Hemoglobin 9.9 (L) 14.0 - 18.0 g/dL    Hematocrit 31.7 (L) 42.0 - 52.0 %     MCV 87.8 81.4 - 97.8 fL    MCH 27.4 27.0 - 33.0 pg    MCHC 31.2 (L) 33.7 - 35.3 g/dL    RDW 47.3 35.9 - 50.0 fL    Platelet Count 301 164 - 446 K/uL    MPV 9.4 9.0 - 12.9 fL    Neutrophils-Polys 82.50 (H) 44.00 - 72.00 %    Lymphocytes 8.70 (L) 22.00 - 41.00 %    Monocytes 7.00 0.00 - 13.40 %    Eosinophils 0.90 0.00 - 6.90 %    Basophils 0.30 0.00 - 1.80 %    Immature Granulocytes 0.60 0.00 - 0.90 %    Nucleated RBC 0.00 /100 WBC    Neutrophils (Absolute) 11.80 (H) 1.82 - 7.42 K/uL    Lymphs (Absolute) 1.24 1.00 - 4.80 K/uL    Monos (Absolute) 1.00 (H) 0.00 - 0.85 K/uL    Eos (Absolute) 0.13 0.00 - 0.51 K/uL    Baso (Absolute) 0.05 0.00 - 0.12 K/uL    Immature Granulocytes (abs) 0.08 0.00 - 0.11 K/uL    NRBC (Absolute) 0.00 K/uL   COMP METABOLIC PANEL    Collection Time: 03/04/22 10:47 PM   Result Value Ref Range    Sodium 137 135 - 145 mmol/L    Potassium 4.4 3.6 - 5.5 mmol/L    Chloride 101 96 - 112 mmol/L    Co2 20 20 - 33 mmol/L    Anion Gap 16.0 7.0 - 16.0    Glucose 220 (H) 65 - 99 mg/dL    Bun 25 (H) 8 - 22 mg/dL    Creatinine 1.15 0.50 - 1.40 mg/dL    Calcium 9.3 8.5 - 10.5 mg/dL    AST(SGOT) 17 12 - 45 U/L    ALT(SGPT) 15 2 - 50 U/L    Alkaline Phosphatase 86 30 - 99 U/L    Total Bilirubin 0.4 0.1 - 1.5 mg/dL    Albumin 3.1 (L) 3.2 - 4.9 g/dL    Total Protein 6.6 6.0 - 8.2 g/dL    Globulin 3.5 1.9 - 3.5 g/dL    A-G Ratio 0.9 g/dL   C Reactive Protein Quantitative (Non-Cardiac)    Collection Time: 03/04/22 10:47 PM   Result Value Ref Range    Stat C-Reactive Protein 12.69 (H) 0.00 - 0.75 mg/dL   Sed Rate    Collection Time: 03/04/22 10:47 PM   Result Value Ref Range    Sed Rate Westergren 85 (H) 0 - 20 mm/hour   proBrain Natriuretic Peptide, NT    Collection Time: 03/04/22 10:47 PM   Result Value Ref Range    NT-proBNP 318 (H) 0 - 125 pg/mL   TROPONIN    Collection Time: 03/04/22 10:47 PM   Result Value Ref Range    Troponin T 32 (H) 6 - 19 ng/L   PROCALCITONIN    Collection Time: 03/04/22 10:47 PM   Result  Value Ref Range    Procalcitonin 0.13 <0.25 ng/mL   ESTIMATED GFR    Collection Time: 22 10:47 PM   Result Value Ref Range    GFR If African American >60 >60 mL/min/1.73 m 2    GFR If Non African American >60 >60 mL/min/1.73 m 2   COD (ADULT)    Collection Time: 22 10:47 PM   Result Value Ref Range    ABO Grouping Only O     Rh Grouping Only NEG     Antibody Screen-Cod NEG    EKG    Collection Time: 22 10:58 PM   Result Value Ref Range    Report       [facility] Emergency Dept.    Test Date:  2022  Pt Name:    ANISA STRINGER               Department: ER  MRN:        [id number]                      Room:       [facility]  Gender:     Male                         Technician: [id number]  :        1945                   Requested By:KORTNEY YOO  Order #:    [id number]                    Reading MD: KORTNEY YOO MD    Measurements  Intervals                                Axis  Rate:       89                           P:          74  NM:         227                          QRS:        -15  QRSD:       95                           T:          -11  QT:         348  QTc:        421    Interpretive Statements  Sinus rhythm  Atrial premature complex  Prolonged NM interval  Inferior infarct, old  Consider anterior infarct  No previous ECG available for comparison  Electronically Signed On 3-5-2022 0:16:56 PST by KORTNEY YOO MD     URINALYSIS    Collection Time: 22 11:33 PM    Specimen: Urine   Result Value Ref Range    Color Yellow     Character Clear     Specific Gravity 1.023 <1.035    Ph 6.0 5.0 - 8.0    Glucose Negative Negative mg/dL    Ketones Negative Negative mg/dL    Protein 30 (A) Negative mg/dL    Bilirubin Negative Negative    Urobilinogen, Urine 1.0 Negative    Nitrite Negative Negative    Leukocyte Esterase Negative Negative    Occult Blood Negative Negative    Micro Urine Req Microscopic    URINE MICROSCOPIC (W/UA)    Collection Time:  03/04/22 11:33 PM   Result Value Ref Range    WBC 0-2 (A) /hpf    RBC 0-2 (A) /hpf    Bacteria Negative None /hpf    Epithelial Cells Negative /hpf    Hyaline Cast 0-2 /lpf   POCT arterial blood gas device results    Collection Time: 03/05/22 12:01 AM   Result Value Ref Range    Ph 7.332 (L) 7.400 - 7.500    Pco2 47.9 (H) 26.0 - 37.0 mmHg    Po2 172 (H) 64 - 87 mmHg    Tco2 27 20 - 33 mmol/L    S02 99 93 - 99 %    Hco3 25.4 (H) 17.0 - 25.0 mmol/L    BE -1 -4 - 3 mmol/L    Body Temp 97.5 F degrees    O2 Therapy 100 %    iPF Ratio 172     Ph Temp Milad 7.341 (L) 7.400 - 7.500    Pco2 Temp Co 46.6 (H) 26.0 - 37.0 mmHg    Po2 Temp Cor 168 (H) 64 - 87 mmHg    Specimen Arterial     DelSys Vent     Tidal Volume 440 mL    Peep End Expiratory Pressure 8 cmh20    Set Rate 26     Mode APV-CMV    POCT sodium device results    Collection Time: 03/05/22 12:01 AM   Result Value Ref Range    Istat Sodium 138 135 - 145 mmol/L   POCT potassium device results    Collection Time: 03/05/22 12:01 AM   Result Value Ref Range    Istat Potassium 4.2 3.6 - 5.5 mmol/L   POCT ionized CA device results    Collection Time: 03/05/22 12:01 AM   Result Value Ref Range    Istat Ionized Calcium 1.32 (H) 1.10 - 1.30 mmol/L   POCT hematocrit and hemoglobin device results    Collection Time: 03/05/22 12:01 AM   Result Value Ref Range    Istat Hematocrit 27 (L) 42 - 52 %    Istat Hemoglobin 9.2 (L) 14.0 - 18.0 g/dL   LACTIC ACID    Collection Time: 03/05/22  1:05 AM   Result Value Ref Range    Lactic Acid 1.2 0.5 - 2.0 mmol/L   Triglycerides Starting now and then Every 3 Days    Collection Time: 03/05/22  1:05 AM   Result Value Ref Range    Triglycerides 63 0 - 149 mg/dL   aPTT    Collection Time: 03/05/22  1:05 AM   Result Value Ref Range    APTT 30.5 24.7 - 36.0 sec   Prothrombin Time    Collection Time: 03/05/22  1:05 AM   Result Value Ref Range    PT 16.2 (H) 12.0 - 14.6 sec    INR 1.35 (H) 0.87 - 1.13   Heparin Xa (Unfractionated)    Collection Time:  03/05/22  1:05 AM   Result Value Ref Range    Heparin Xa (UFH) <0.10 IU/mL   Comp Metabolic Panel    Collection Time: 03/05/22  1:05 AM   Result Value Ref Range    Sodium 135 135 - 145 mmol/L    Potassium 4.5 3.6 - 5.5 mmol/L    Chloride 100 96 - 112 mmol/L    Co2 23 20 - 33 mmol/L    Anion Gap 12.0 7.0 - 16.0    Glucose 217 (H) 65 - 99 mg/dL    Bun 26 (H) 8 - 22 mg/dL    Creatinine 1.11 0.50 - 1.40 mg/dL    Calcium 9.3 8.5 - 10.5 mg/dL    AST(SGOT) 19 12 - 45 U/L    ALT(SGPT) 14 2 - 50 U/L    Alkaline Phosphatase 92 30 - 99 U/L    Total Bilirubin 0.6 0.1 - 1.5 mg/dL    Albumin 3.0 (L) 3.2 - 4.9 g/dL    Total Protein 6.4 6.0 - 8.2 g/dL    Globulin 3.4 1.9 - 3.5 g/dL    A-G Ratio 0.9 g/dL   ESTIMATED GFR    Collection Time: 03/05/22  1:05 AM   Result Value Ref Range    GFR If African American >60 >60 mL/min/1.73 m 2    GFR If Non African American >60 >60 mL/min/1.73 m 2   CoV-2, FLU A/B, and RSV by PCR (2-4 Hours CEPHEID) : Collect NP swab in VTM    Collection Time: 03/05/22  1:15 AM    Specimen: Nasopharyngeal; Respirate   Result Value Ref Range    Influenza virus A RNA Negative Negative    Influenza virus B, PCR Negative Negative    RSV, PCR Negative Negative    SARS-CoV-2 by PCR NotDetected     SARS-CoV-2 Source NP Swab    ABO Rh Confirm    Collection Time: 03/05/22  6:00 AM   Result Value Ref Range    ABO Rh Confirm O NEG    TROPONIN    Collection Time: 03/05/22  6:00 AM   Result Value Ref Range    Troponin T 406 (H) 6 - 19 ng/L   HEMOGLOBIN A1C    Collection Time: 03/05/22  6:00 AM   Result Value Ref Range    Glycohemoglobin 5.9 (H) 4.0 - 5.6 %    Est Avg Glucose 123 mg/dL   Lipid Profile    Collection Time: 03/05/22  6:00 AM   Result Value Ref Range    Cholesterol,Tot 74 (L) 100 - 199 mg/dL    Triglycerides 60 0 - 149 mg/dL    HDL 28 (A) >=40 mg/dL    LDL 34 <100 mg/dL   Comp Metabolic Panel    Collection Time: 03/05/22  6:00 AM   Result Value Ref Range    Sodium 134 (L) 135 - 145 mmol/L    Potassium 4.4 3.6 -  5.5 mmol/L    Chloride 101 96 - 112 mmol/L    Co2 20 20 - 33 mmol/L    Anion Gap 13.0 7.0 - 16.0    Glucose 181 (H) 65 - 99 mg/dL    Bun 26 (H) 8 - 22 mg/dL    Creatinine 1.01 0.50 - 1.40 mg/dL    Calcium 9.0 8.5 - 10.5 mg/dL    AST(SGOT) 26 12 - 45 U/L    ALT(SGPT) 19 2 - 50 U/L    Alkaline Phosphatase 85 30 - 99 U/L    Total Bilirubin 0.4 0.1 - 1.5 mg/dL    Albumin 2.7 (L) 3.2 - 4.9 g/dL    Total Protein 5.9 (L) 6.0 - 8.2 g/dL    Globulin 3.2 1.9 - 3.5 g/dL    A-G Ratio 0.8 g/dL   ESTIMATED GFR    Collection Time: 22  6:00 AM   Result Value Ref Range    GFR If African American >60 >60 mL/min/1.73 m 2    GFR If Non African American >60 >60 mL/min/1.73 m 2   EKG    Collection Time: 22  7:53 AM   Result Value Ref Range    Report       [facility]    Test Date:  2022  Pt Name:    ANISA MYKE               Department: [facility]  MRN:        [id number]                      Room:       R111  Gender:     Male                         Technician: JESUS  :        1945                   Requested By:SAHIL WILLIS  Order #:    [id number]                    Reading MD:    Measurements  Intervals                                Axis  Rate:       61                           P:          65  TN:         225                          QRS:        31  QRSD:       92                           T:          26  QT:         400  QTc:        403    Interpretive Statements  UNKNOWN RHYTHM, IRREGULAR RATE  54- 77  FIRST DEGREE AV BLOCK  LOW VOLTAGE THROUGHOUT  BORDERLINE R WAVE PROGRESSION, ANTERIOR LEADS  Compared to ECG 2022 22:58:59  Low QRS voltage now present  Sinus rhythm no longer present  Atrial premature complex(es) no longer present  Myocardial infarct finding no longer present     PROTHROMBIN TIME    Collection Time: 22  8:40 AM   Result Value Ref Range    PT 17.1 (H) 12.0 - 14.6 sec    INR 1.44 (H) 0.87 - 1.13   APTT    Collection Time: 22  8:40 AM   Result Value Ref Range    APTT  65.8 (H) 24.7 - 36.0 sec   Heparin Xa (Unfractionated)    Collection Time: 03/05/22  8:40 AM   Result Value Ref Range    Heparin Xa (UFH) 0.15 IU/mL   Lipid Profile    Collection Time: 03/05/22  8:40 AM   Result Value Ref Range    Cholesterol,Tot 78 (L) 100 - 199 mg/dL    Triglycerides 59 0 - 149 mg/dL    HDL 29 (A) >=40 mg/dL    LDL 37 <100 mg/dL   CBC WITH DIFFERENTIAL    Collection Time: 03/05/22  8:40 AM   Result Value Ref Range    WBC 15.2 (H) 4.8 - 10.8 K/uL    RBC 3.69 (L) 4.70 - 6.10 M/uL    Hemoglobin 10.2 (L) 14.0 - 18.0 g/dL    Hematocrit 31.7 (L) 42.0 - 52.0 %    MCV 85.9 81.4 - 97.8 fL    MCH 27.6 27.0 - 33.0 pg    MCHC 32.2 (L) 33.7 - 35.3 g/dL    RDW 46.1 35.9 - 50.0 fL    Platelet Count 276 164 - 446 K/uL    MPV 9.6 9.0 - 12.9 fL    Neutrophils-Polys 84.80 (H) 44.00 - 72.00 %    Lymphocytes 7.10 (L) 22.00 - 41.00 %    Monocytes 6.70 0.00 - 13.40 %    Eosinophils 0.60 0.00 - 6.90 %    Basophils 0.30 0.00 - 1.80 %    Immature Granulocytes 0.50 0.00 - 0.90 %    Nucleated RBC 0.00 /100 WBC    Neutrophils (Absolute) 12.86 (H) 1.82 - 7.42 K/uL    Lymphs (Absolute) 1.08 1.00 - 4.80 K/uL    Monos (Absolute) 1.02 (H) 0.00 - 0.85 K/uL    Eos (Absolute) 0.09 0.00 - 0.51 K/uL    Baso (Absolute) 0.05 0.00 - 0.12 K/uL    Immature Granulocytes (abs) 0.08 0.00 - 0.11 K/uL    NRBC (Absolute) 0.00 K/uL   POCT venous blood gas device results    Collection Time: 03/05/22  9:02 AM   Result Value Ref Range    Ph 7.440 7.310 - 7.450    Pco2 35.3 (L) 41.0 - 51.0 mmHg    Po2 32 25 - 40 mmHg    Tco2 25 20 - 33 mmol/L    SO2 66 %    Hco3 24.0 24.0 - 28.0 mmol/L    BE 0 -4 - 3 mmol/L    Body Temp 97.2 F degrees    O2 Therapy 40 %    iPF Ratio 80     Ph Temp Correc 7.451 (H) 7.310 - 7.450    Pco2 Temp Milad 34.2 (L) 41.0 - 51.0 mmHg    Po2 Temp Corre 31 25 - 40 mmHg    Specimen Venous     DelSys Vent     End Tidal Carbon Dioxide 22 mmhg    Tidal Volume 500 mL    Peep End Expiratory Pressure 8 cmh20    Set Rate 20     Mode APV-CMV     POCT lactate device results    Collection Time: 03/05/22  9:02 AM   Result Value Ref Range    iStat Lactate 1.1 0.5 - 2.0 mmol/L       Labs reviewed by me.         Imaging reviewed by me:     US-EXTREMITY ARTERY LOWER BILAT   Final Result      EC-ECHOCARDIOGRAM LTD W/ CONT         US-EXTREMITY ARTERY UPPER UNILAT LEFT   Final Result      US-EXTREMITY VENOUS UPPER UNILAT LEFT   Final Result      US-EXTREMITY VENOUS LOWER BILAT         MR-LUMBAR SPINE-WITH & W/O   Final Result      1.  Abnormal T2 fluid signal intensity in the L1, L2 and to a lesser extent the L5-S1 vertebral disc spaces. There is no definite evidence of adjacent marrow edema signal or enhancement in the opposing endplates. Early discitis at these levels cannot be    excluded. Short interval imaging follow-up is recommended as clinically appropriate.   2.  There is diffuse enhancement in the surgical bed extending to the level of the dorsal thecal sac. There is no definite evidence of epidural fluid collection or abscess. Evaluation of the postoperative levels is somewhat limited by hardware magnetic    susceptibility artifact.   3.  Postsurgical changes of the lumbar spine with laminectomy defects extending from L2 to S1 right posterior hemifusion hardware at L4-5 and lateral fusion hardware L4-5. A disc spacer device appears present at L4-5.   4.  Multilevel degenerative changes of the lumbar spine as described above.      MR-BRAIN-WITH & W/O   Final Result      1.  Moderate to large sized acute to subacute infarcts in the left anterior cerebral artery territory, left parietal, right parietal, bilateral occipital lobes and right cerebellum. Patchy areas of enhancement are noted in the left anterior cerebral    artery territory and right cerebellum which is more consistent with subacute chronicity. There is no evidence of hemorrhagic transformation.   2.  Chronic right frontal lobe and bilateral cerebellar infarcts.   3.  Mild diffuse cerebral  substance loss.   4.  Moderate microangiopathic ischemic change versus demyelination or gliosis.      CT-CTA NECK WITH & W/O-POST PROCESSING   Final Result      CT angiogram of the neck within normal limits.      CT-CTA HEAD WITH & W/O-POST PROCESS   Final Result      CT angiogram of the [place] within normal limits.      CT-CEREBRAL PERFUSION ANALYSIS   Final Result      1.  Cerebral blood flow less than 30% likely representing completed infarct = 0 mL.      2.  T Max more than 6 seconds likely representing combination of completed infarct and ischemia = 27 mL.      3.  Mismatched volume likely representing ischemic brain/penumbra = 27      4.  Please note that the cerebral perfusion was performed on the limited brain tissue around the basal ganglia region. Infarct/ischemia outside the CT perfusion sections can be missed in this study.      CT-ABDOMEN-PELVIS WITH   Final Result      1.  Several limited areas of decreased enhancement in the spleen are present consistent with splenic infarcts.      2.  Renal infarcts also appear to be present in the parenchyma of the left kidney.      3.  Tubular filling defect consistent with thrombus is identified in the aorta as described above.      4.  Some wall thickening appears to be present in the cecum which is not fully distended. This could be due to lack of distention or spasm although colitis is also possible.      5.  Consolidation and volume loss in the left lung base.      CT-HEAD W/O   Final Result         1.  Brain swelling and edema and right cerebellar hemisphere is noted which could indicate subacute infarct.      2.  Evidence of previous right frontal lobe infarct with encephalomalacia.      3.  No intracranial hemorrhage.      Decreased attenuation in the cerebral white matter likely indicates microvascular ischemic disease.         CT-CTA CHEST PULMONARY ARTERY W/ RECONS   Final Result      1.  Exam is positive for significant pulmonary emboli  bilaterally.      2.  Abnormally elevated RV LV ratio is present.      3.  Consolidation and volume loss present in much of the left lower lobe could be due to pneumonia. Pulmonary infarct is also possible.      These findings were discussed with BON FALCON on 03/05/2022. Abdominal CT results also discussed..      DX-CHEST-PORTABLE (1 VIEW)   Final Result         1.  Right central line noted with tip at the level of the SVC. No pneumothorax identified. Endotracheal tube is noted in place.      DX-CHEST-PORTABLE (1 VIEW)   Final Result         1.  Elevation of left hemidiaphragm and probable atelectasis in the left lung base. Developing pneumonitis or pneumonia is also possible.      2.  No other infiltrates or consolidations identified.            Presumed mechanism by TOAST:  __Large Artery Atherosclerosis  __Small Vessel (Lacunar)  _X_Cardioembolic  __Other (Sickle Cell, Vasculitis, Hypercoagulable)  __Unknown    Modified [place] Scale (MRS): 3 = Moderate disability; requires some help, but able to walk without assistance      ASSESSMENT AND PLAN:  76-year old male with PMHx significant for Lumbar fusion in November, complicated by MSSA surgical site incision of the lumbar spine requiring washout in December 2021 and MSSA bacteremia with ischemic stroke secondary to septic emboli (without endocarditis seen on SALVATORE at that time ) in January 2022, also with  hypertension and hyperlipidemia who again presented to [facility] on [facility]/5/22 for a chief complaint of altered mental status and hypoxia; here found to be hypotensive, question of recurrent sepsis (leukocytosis with WBC 14.3 with neutrophilia). Overnight patient was found to be obtunded, stroke alert called. Imaging has ultimately revealed acute ischemic strokes involving Left frontal, Left parietal region (Left CAROL ANN/MCA territories) as well as right parietal, bilateral occipital lobes and right cerebellum concerning for cardioembolic phenomena. Patient  was also found to have large/extensive PE; CTA also demonstrated thrombus within the perivisceral aorta with associated splenic and renal artery infarcts. Patient has thus been started on Heparin gtt for systemic anticoagulation.     Patient's exam is somewhat poor; he does not follow commands; is non interactive at time of my exam. Given extensive disease burden (ie numerous thromboembolic events involving several organ systems), his prognosis is guarded; in considering extent/nature of his acute ischemic stroke--likely survivable, however chance of meaningful neurological and functional recovery is questionable at this point given his already protracted recovery course. Recommend palliative care consultation and further goals of care discussion with patient's family.     Recommendations/Plan:     -q2h and PRN neuro assessment. VS per nursing/unit protocol. BP goal is normotension (note CTA head/neck with no LVO nor hemodynamically significant stenosis); 100-130/60-80.  Antihypertensives per primary team.   -Telemetry; currently SR. Screen for Afib/arrhythmia. Obtain TTE with bubble study.   -Continue Heparin gtt. Management per ICU team.   -Given nature of stroke as well as LDL 37, OK to forgo statin tx at this time.   -Recommend aggressive BG management per primary team. Avoid IVF with Dextrose. BG goal 140-180. hemoglobin A1c is 5.9.   -PT/OT/SLP eval and treat, if/when appropriate.   -All other medical management per ICU team. Blood cultures pending. Hypertonic gtt started per ICU team for Na 134 and possibility of evolving cerebral edema.   -DVT PPX: SCDs.      Will follow. Please call with questions.     ROBIN Berman.P.R.N.  [employer]

## 2022-03-05 NOTE — PROCEDURES
ARTERIAL LINE PROCEDURE NOTE    Radial Arterial Line Procedure Note   INDICATION: Extensive of meds   PROCEDURE : Resident Dr Josephine Lucas   ATTENDING PHYSICIAN: Dr Workman In Attendance (Y)     CONSENT:      [X] During the informed consent discussion regarding the procedure, or treatment, I explained the following to the patient's designee:    a. Nature of the procedure or treatment and who will perform the procedure or treatment.    b. Necessity for procedure and the possible benefits.    c. Risks and complications (most common and serious).    d. Alternative treatments and the risks, benefits and side effects of each (including no treatment).    e. Likelihood of the patient achieving his/her goals without this procedure and surgery treatment.    f. Problems that might occur during the recuperation.    g. Conflicts of interest, if any         PROCEDURE SUMMARY:     A time out was performed. My hands were washed immediately prior to the  procedure. I wore a surgical cap, mask with protective eyewear, sterile  gown and sterile gloves throughout the procedure. After an Yovani test  was performed to ensure adequate perfusion, the R wrist was prepped using chlorhexidine scrub and draped in sterile fashion using a  three quarter sheet drape and sterile towels. The radial pulse was  identified and the wrist was positioned in the usual fashion. Anesthesia  was achieved using 1% lidocaine. Using the Arrow Radial Arterial Line  Kit, a needle was inserted into the radial artery. Arterial blood was  seen to pulsate in the flash chamber. The internal guidewire was  advanced easily into the radial artery. The catheter was then advanced  over the wire and the needle and wire were withdrawn. The catheter was  sutured in place. A sterile opsite was placed over the catheter at the  insertion site. The patient tolerated the procedure without any  hemodynamic compromise. At the time of procedure completion, the  catheter was  connected to the cardiac monitor and calibrated.  Appropriate waveform and blood pressure tracing was observed.    Estimated blood loss is .5CC.    Procedure performed by Dr Workman and resident Dr Josephine Lucas

## 2022-03-05 NOTE — ASSESSMENT & PLAN NOTE
Patient with clots noted in both venous (both PE, and stroke), also with arterial thrombus in aorta and renal and splenic infarcts  With history of continued MSSA bacteremia post surgery with hardware infection very likely patient still has staph bacteremia which has led to septic emboli and infarcts  On appropriate antibiotics  No evidence of infection r fluid collection or abscess on MRI lumbar  TTE without evidence of patent foramen ovale or endocarditis  Anticoagulation recommended for aortic thrombus and PE, however with multiple septic emboli strokes of different ages high risk for hemorrhagic conversion  Awaiting for SALVATORE

## 2022-03-05 NOTE — PROGRESS NOTES
0040: patient transported from CT scan to RICU 111 with Primary RN and respiratory therapist. Patient remained on monitor the entire time. VSS throughout transport.    0130: Family at bedside & Dr. Amador updating on results of scan.     0200: MRI screening completed by wife and RN. Dr. Amador at bedside. Instructed to go back to CT for stat CTA and perfusion study.     0215: Patient transported with primary RN, MRI technician, and RT to CT.     0245: at conclusion of CT Dr. Amador in room and contacting RAD to have images read. Per Dr. Amador heparin gtt to be stopped at this time.     0300: Patient in MRI on levophed and propofol. Patient remains on monitor and VSS. Dr. Amador updated of patients status.     0445: Patient transported back to room without incident. Dr. Amador notified.     0530: Ultrasound at bedside.     0600: MD made aware of several possible clots both venous and arterial per ultrasound.

## 2022-03-05 NOTE — ASSESSMENT & PLAN NOTE
Aortic thrombus 1.2 cm diameter-6 cm length and abdominal aorta above the level of renal arteries  With patient's history of septic emboli and infection, likely this is a septic thrombus  Complicated by renal and splenic infarcts  Discussed with Dr Bai the [facility] surgeon, and he agrees that current treatment with heparin despite the risks is probably the best course of action

## 2022-03-05 NOTE — DISCHARGE PLANNING
Medical Social Work     HEATHER met with the pt wife Poonam and escorted her to the family room and proved her with emotional support. HEATHER also escorted the pt son Lyle to the family room with Poonam. The pt was transported to the [facility] ICU, SW escorted both Poonam and Lyle to the [facility].     Poonam (wife) [phone]    Plan: SW will remain available if any needs arise.

## 2022-03-05 NOTE — ASSESSMENT & PLAN NOTE
Patient was admitted with AMS and obtunded  CT brain, MRI, and CTA of neck consistent with multiple sub infarcts suspected to be secondary to septic embolism  It is unknown if patient had a previously found patent foramen ovale or if a valvular vegetation has occurred in the past to result in a multi-level of embolism  Another cause could be emboli from thrombi found into the aorta  COVID antibodies have been negative  Patient was started on heparin after discussion of risks and benefits with family and the possible complication of hemorrhagic transformation.   The window is very small in regard of option given the bilateral PE and thrombi in the spleen and kidneys  Close follow up with Q2H neuro check  Awaiting for echo

## 2022-03-05 NOTE — PROCEDURES
"Arterial Line Insertion    Date/Time: 3/5/2022 1:09 PM  Performed by: Richardson Workman M.D.  Authorized by: Richardson Workman M.D.   Consent: Written consent obtained.  Consent given by: spouse  Patient understanding: patient states understanding of the procedure being performed  Patient consent: the patient's understanding of the procedure matches consent given  Procedure consent: procedure consent matches procedure scheduled  Test results: test results available and properly labeled  Patient identity confirmed: hospital-assigned identification number  Time out: Immediately prior to procedure a \"time out\" was called to verify the correct patient, procedure, equipment, support staff and site/side marked as required.  Preparation: Patient was prepped and draped in the usual sterile fashion.  Indications: multiple ABGs, respiratory failure and hemodynamic monitoring  Location: right radial  Anesthesia: local infiltration    Anesthesia:  Local Anesthetic: lidocaine 1% without epinephrine  Anesthetic total: 0.5 mL    Sedation:  Patient sedated: no    Yovani's test normal: yes  Needle gauge: 20  Seldinger technique: Seldinger technique used  Number of attempts: 2  Post-procedure: dressing applied  Post-procedure CMS: unchanged  Patient tolerance: patient tolerated the procedure well with no immediate complications              "

## 2022-03-05 NOTE — ED NOTES
Intubation performed at bedside with JOSE Talavera at bedside. 8.0 ETT placed, positive color change, 26 at the teeth.

## 2022-03-05 NOTE — ASSESSMENT & PLAN NOTE
Patient had lumbar fusion in November 2021, this was complicated in December by a MSSA paraspinal infection likely including hardware , requiring wound washout and long course of antibiotics including rifampin at home  Patient had recurrent bacteremia in January-however could not find evidence that lumbar spine was evaluated at that time again.  Per wife patient has pain intermittently at site, but in the last few days has had increasing pain and some swelling around the site  MRI of lumbar spine ordered  We will discuss with spine surgery when results are available  On Vanco and cefepime

## 2022-03-05 NOTE — ED TRIAGE NOTES
"Chief Complaint   Patient presents with   • ALOC     Pt BIBA from home after being altered per wife for the last hour. Pt found to be 86% on RA and was placed on 6 L n/c. Pt [facility] and had post-op complication and placed on antibiotics which he's been compliant with. Pt responsive to painful stimuli on arrival. GCS 9. . Pt BP 90/57 on arrival.        Pt BIBA for above complaint. Pt unresponsive on arrival GCS 9. Pt placed on high-flow n/c per ERP. 0.4 of narcan given no response noted. ERP at bedside on arrival code sepsis called with score of 6.  Labs collected and sent.     EMS placed 2 PIVs PTA.     BP (!) 90/57   Pulse 92   Temp 36.4 °C (97.6 °F) (Temporal)   Resp 18   Ht 1.88 m (6' 2\")   Wt 111 kg (245 lb)   SpO2 93%     "

## 2022-03-05 NOTE — CONSULTS
VASCULAR SURGERY CONSULTATION      DATE OF CONSULTATION: 3/5/2022     REFERRING PHYSICIAN: Dr. Arminda Amador    CONSULTING PHYSICIAN: Vinicius Bia M.D.    REASON FOR CONSULTATION: Evaluate patient with multiple thrombotic events    HISTORY OF PRESENT ILLNESS: The patient is a 76 y.o. male with a past medical history of lumbar fusion back in November.  This procedure was complicated by a methicillin sensitive staph aureus surgical site infection.  The patient in December underwent washout.  The patient was diagnosed with bacteremia in January with suspected CVA.  The patient has been on outpatient antibiotics.  The patient presented to the emergency room with tachypnea and hypoxemia.  The patient also had more altered mental status and rapidly became obtunded.  The patient was intubated and a work-up was initiated.  So far the evaluation includes a CTA which demonstrates multiple pulmonary emboli throughout the pulmonary vasculature with possible pulmonary infarction.  The patient's right heart was dilated consistent with right heart strain.  In addition, a CTA demonstrates the thrombus within the perivisceral aorta with associated splenic and renal artery infarcts.  In addition the patient was diagnosed with an acute deep vein thrombosis.  The patient is currently endotracheally intubated and obtunded.  The patient's lower extremities are warm and adequately perfused.  There is no evidence of mesenteric ischemia on CT scan although the cecum did show a slight area of thickening.  MRI of the patient's brain demonstrates a large volume infarct both within the parietal as well as the occipital lobes.    PAST MEDICAL HISTORY:       PAST SURGICAL HISTORY: patient denies any surgical history     ALLERGIES: No Known Allergies     CURRENT MEDICATIONS:   Home Medications    **Home medications have not yet been reviewed for this encounter**         FAMILY HISTORY: History reviewed. No pertinent family history.    History  reviewed. No pertinent family history.       SOCIAL HISTORY:   Social History     Tobacco Use   • Smoking status: Unknown If Ever Smoked   • Smokeless tobacco: Not on file   Substance and Sexual Activity   • Alcohol use: Not on file   • Drug use: Not on file   • Sexual activity: Not on file       ROS   Unobtainable  All other systems were reviewed and are negative (AMA/CMS criteria)                Physical Exam    Patient endotracheally intubated  Obtunded  Unresponsive  Bilateral lower extremities are warm and adequately perfused  Upper extremities are warm and adequately perfused  Abdomen is soft  Neurologically the patient is unconscious      LABORATORY VALUES:   Recent Labs     03/04/22 2247 03/05/22  0600 03/05/22  0840   WBC 14.3* 14.2* 15.2*   RBC 3.61* 3.69* 3.69*   HEMOGLOBIN 9.9* 10.1* 10.2*   HEMATOCRIT 31.7* 32.2* 31.7*   MCV 87.8 87.3 85.9   MCH 27.4 27.4 27.6   MCHC 31.2* 31.4* 32.2*   RDW 47.3 47.3 46.1   PLATELETCT 301 301 276   MPV 9.4 9.8 9.6     Recent Labs     03/04/22 2247 03/05/22 0105 03/05/22  0600   SODIUM 137 135 134*   POTASSIUM 4.4 4.5 4.4   CHLORIDE 101 100 101   CO2 20 23 20   GLUCOSE 220* 217* 181*   BUN 25* 26* 26*   CREATININE 1.15 1.11 1.01   CALCIUM 9.3 9.3 9.0     Recent Labs     03/04/22 2247 03/05/22 0105 03/05/22  0600 03/05/22  0840   ASTSGOT 17 19 26  --    ALTSGPT 15 14 19  --    TBILIRUBIN 0.4 0.6 0.4  --    ALKPHOSPHAT 86 92 85  --    GLOBULIN 3.5 3.4 3.2  --    INR  --  1.35*  --  1.44*     Recent Labs     03/05/22 0105 03/05/22  0840   APTT 30.5 65.8*   INR 1.35* 1.44*        IMAGING:   US-EXTREMITY ARTERY LOWER BILAT   Final Result      EC-ECHOCARDIOGRAM LTD W/ CONT         US-EXTREMITY ARTERY UPPER UNILAT LEFT   Final Result      US-EXTREMITY VENOUS UPPER UNILAT LEFT   Final Result      US-EXTREMITY VENOUS LOWER BILAT         MR-LUMBAR SPINE-WITH & W/O   Final Result      1.  Abnormal T2 fluid signal intensity in the L1, L2 and to a lesser extent the L5-S1  vertebral disc spaces. There is no definite evidence of adjacent marrow edema signal or enhancement in the opposing endplates. Early discitis at these levels cannot be    excluded. Short interval imaging follow-up is recommended as clinically appropriate.   2.  There is diffuse enhancement in the surgical bed extending to the level of the dorsal thecal sac. There is no definite evidence of epidural fluid collection or abscess. Evaluation of the postoperative levels is somewhat limited by hardware magnetic    susceptibility artifact.   3.  Postsurgical changes of the lumbar spine with laminectomy defects extending from L2 to S1 right posterior hemifusion hardware at L4-5 and lateral fusion hardware L4-5. A disc spacer device appears present at L4-5.   4.  Multilevel degenerative changes of the lumbar spine as described above.      MR-BRAIN-WITH & W/O   Final Result      1.  Moderate to large sized acute to subacute infarcts in the left anterior cerebral artery territory, left parietal, right parietal, bilateral occipital lobes and right cerebellum. Patchy areas of enhancement are noted in the left anterior cerebral    artery territory and right cerebellum which is more consistent with subacute chronicity. There is no evidence of hemorrhagic transformation.   2.  Chronic right frontal lobe and bilateral cerebellar infarcts.   3.  Mild diffuse cerebral substance loss.   4.  Moderate microangiopathic ischemic change versus demyelination or gliosis.      CT-CTA NECK WITH & W/O-POST PROCESSING   Final Result      CT angiogram of the neck within normal limits.      CT-CTA HEAD WITH & W/O-POST PROCESS   Final Result      CT angiogram of the [place] within normal limits.      CT-CEREBRAL PERFUSION ANALYSIS   Final Result      1.  Cerebral blood flow less than 30% likely representing completed infarct = 0 mL.      2.  T Max more than 6 seconds likely representing combination of completed infarct and ischemia = 27 mL.       3.  Mismatched volume likely representing ischemic brain/penumbra = 27      4.  Please note that the cerebral perfusion was performed on the limited brain tissue around the basal ganglia region. Infarct/ischemia outside the CT perfusion sections can be missed in this study.      CT-ABDOMEN-PELVIS WITH   Final Result      1.  Several limited areas of decreased enhancement in the spleen are present consistent with splenic infarcts.      2.  Renal infarcts also appear to be present in the parenchyma of the left kidney.      3.  Tubular filling defect consistent with thrombus is identified in the aorta as described above.      4.  Some wall thickening appears to be present in the cecum which is not fully distended. This could be due to lack of distention or spasm although colitis is also possible.      5.  Consolidation and volume loss in the left lung base.      CT-HEAD W/O   Final Result         1.  Brain swelling and edema and right cerebellar hemisphere is noted which could indicate subacute infarct.      2.  Evidence of previous right frontal lobe infarct with encephalomalacia.      3.  No intracranial hemorrhage.      Decreased attenuation in the cerebral white matter likely indicates microvascular ischemic disease.         CT-CTA CHEST PULMONARY ARTERY W/ RECONS   Final Result      1.  Exam is positive for significant pulmonary emboli bilaterally.      2.  Abnormally elevated RV LV ratio is present.      3.  Consolidation and volume loss present in much of the left lower lobe could be due to pneumonia. Pulmonary infarct is also possible.      These findings were discussed with BON FALCON on 03/05/2022. Abdominal CT results also discussed..      DX-CHEST-PORTABLE (1 VIEW)   Final Result         1.  Right central line noted with tip at the level of the SVC. No pneumothorax identified. Endotracheal tube is noted in place.      DX-CHEST-PORTABLE (1 VIEW)   Final Result         1.  Elevation of left  hemidiaphragm and probable atelectasis in the left lung base. Developing pneumonitis or pneumonia is also possible.      2.  No other infiltrates or consolidations identified.          IMPRESSION AND PLAN:     Active Hospital Problems    Diagnosis    • Septic shock (HCC) [A41.9, R65.21]    • Pulmonary embolism (HCC) [I26.99]    • Aortic thrombus (HCC) [I74.10]    • Splenic infarct [D73.5]    • Renal infarct (HCC) [N28.0]    • Cerebrovascular accident (CVA) due to embolism (HCC) [I63.9]    • Septic embolism (HCC) [I76]    • Wound infection complicating hardware, sequela [T84.7XXS]    • S/P lumbar fusion [Z98.1]    • Encephalopathy [G93.40]    • Acute respiratory failure with hypoxia (HCC) [J96.01]      Multiple acute thrombotic events both within the venous and arterial circulation.  Certainly raises the suspicion for a paradoxical embolus  Bubble study is currently pending  With respect to the patient's perivisceral aortic thrombus, first-line therapy would be systemic anticoagulation.  The patient is currently on a heparin drip.  There is no indication for direct aortic thrombectomy in this setting  Certainly with systemic anticoagulation in the setting of a large volume ischemic infarct there is the possibility of hemorrhagic conversion.  I had a long discussion with the patient's family with respect to the indications for systemic anticoagulation and the clinical judgment involved in making the determination between further complications associated with thromboembolism/thrombosis and that of hemorrhagic conversion.  The same conversation was had with the critical care team and it is our unified clinical decision to systemically anticoagulate the patient believing that the risk of further thrombosis and thromboembolism outweighs the risk of hemorrhagic conversion in this setting although both are likely high.  Follow patient clinically  Prognosis poor based on the volume of infarct seen on MRI    Vinicius Bai MD    ____________________________________   Vinicius Bai M.D.          DD: 3/5/2022   DT: 9:40 AM

## 2022-03-05 NOTE — ASSESSMENT & PLAN NOTE
This likely to be secondary to septic shock and with the moderate size subacute infarct found on imaging. There are just too many areas on hyperdensity that would correspond to brain injury that could jeopardize any future functional activity  This is ultimately associated with septic embolism secondary to infection that is also a main cause of AMS  Other conditions such as TSH, vitamin def are unlikely but will rule them out as an AMS workup.  Although the prognosis appears grim, we will only be able to fully assess functionality once patient is more stable and possibly extubated

## 2022-03-05 NOTE — THERAPY
Missed Therapy     Patient Name: Adan Espinosa  Age:  76 y.o., Sex:  male  Medical Record #: [id number]  Today's Date: 3/5/2022    Discussed missed therapy with nursing. Pt not able to participate this AM as he required sedation and intubation. ST to hold until contacted by nursing or new orders received to indicate patient is able to participate. RN in agreement        03/05/22 0846   Treatment Variance   Reason For Missed Therapy Medical - Patient Is Not Medically Stable;Medical - Patient not Able To Participate   Total Time Spent   Total Time Spent (Mins) 5   Vitals   O2 (LPM) 60   O2 Delivery Device Ventilator   Interdisciplinary Plan of Care Collaboration   IDT Collaboration with  Nursing   Session Information   Date / Session Number 3/5 Note Only   Priority 0  (intubated/sedated, hold until appropriate)

## 2022-03-05 NOTE — ASSESSMENT & PLAN NOTE
Lumbar fusion for lumbar stenosis with neurologic claudication in November 2021 with multiple complications as above and below

## 2022-03-05 NOTE — DISCHARGE PLANNING
[facility] Rehabilitation Transitional Care Coordination    Referral from:  Dr Amador  Insurance Provider on Facesheet: Uninsured  Potential Rehab Diagnosis: CVA    Chart review indicates patient may need on going medical management and may have therapy needs to possibly meet inpatient rehab facility criteria with the goal of returning to community.    D/C support: TBD     Physiatry consultation forwarded per protocol.     Last Covid test:  3/5 negative    PEs, CVA, h/o complicated lumbar fusion. Patient is intubated, pending therapy evals when appropriate. Physiatry to consult, TCC will continue to follow. Patient is listed as uninsured, please have PFA verify patient's insurance.     Thank you for the referral.

## 2022-03-05 NOTE — PROGRESS NOTES
Neurology was contacted for this 76 year old man with a complicated medical history who is admitted for altered mental status. He had lumbar surgery in November and developed infection of the hardware, leading to drainage surgery. Following this, he reportedly had sepsis and ischemic strokes thought to be related to septic emboli in January 2022.    Today, he was admitted for a decline of consciousness in the setting of hypotension (90/57) and hypoxia in the low 80's. He was intubated for airway protection, started on broad spectrum antibiotics, and admitted to RICU for sepsis workup. He was also noted to have possible bilateral PE.    A stroke alert was initiated by ICU attending due to difficulty arousing the patient. He was reportedly moving all extremities per ERP, but became increasingly somnolent over the night. His LSN is somewhat unclear, but according to ICU staff, it was around 10:00PM.    CTH was reviewed. It shows scattered bilateral hypodensities that appear subacute to chronic in nature, most notable in the R frontal lobe. There is a more subtle area of hypodensity in the R cerebellum, which could indicate a more acute infarct.  CTP shows scattered small cerebellar and L occipital perfusion deficits that may represent movement artifact or small cortical strokes.  CTA head/neck reveals no large vessel stenosis or cutoffs.    At this time, the patient is not a candidate for systemic tPA given recent stroke in January, unclear LSN, and concern for ongoing septic emboli.  In the setting of septic infarcts, anticoagulation should be avoided if possible. This is complicated by the patient's bilateral PE's, and treatment of these should be weighed with the risk of hemorrhagic conversion of any strokes that may be present. The patient's respiratory status is obviously vital, so I would tend to recommend treatment of systemic clots that could be otherwise fatal. If possible, catheter-directed tPA or  thrombectomy may be lower risk from a neurologic standpoint.    Further recommend treatment of the patient's systemic infection and coagulopathy as appropriate per primary team.    -From a neurologic/stroke standpoint, there are no large vessel occlusions amenable to thrombectomy.   -Avoid AC and ASA unless necessary to treat other conditions, as above.   -Check MRI Brain without contrast when patient is stable.  -Support BP for a goal of 110-185/.  - Full neuro consult to follow in AM.

## 2022-03-05 NOTE — CARE PLAN
The patient is Unstable - High likelihood or risk of patient condition declining or worsening    Shift Goals  Clinical Goals: Maintain hemodynamic stability  Patient Goals: Unable to assess  Family Goals: Unable to assess    Progress made toward(s) clinical / shift goals:  Established Arterial line for invasive hemodynamic monitoring.     Patient is not progressing towards the following goals:Increased engagement.       Problem: Urinary Elimination  Goal: Establish and maintain regular urinary output  Outcome: Progressing  Patient has an indwelling catheter and producing urine.      Problem: Skin Integrity  Goal: Skin integrity is maintained or improved  Outcome: Progressing  Patient has had two RN skin check, bed bath and has appropriate protocols in place including mepilex placed.

## 2022-03-05 NOTE — CARE PLAN
Vent Day 2    CMV 26,vt 500 cc, peep 8, Fio2 60%    No SBT's      Problem: Ventilation  Goal: Ability to achieve and maintain unassisted ventilation or tolerate decreased levels of ventilator support  Description: Target End Date:  4 days     Document on Vent flowsheet    1.  Support and monitor invasive and noninvasive mechanical ventilation  2.  Monitor ventilator weaning response  3.  Perform ventilator associated pneumonia prevention interventions  4.  Manage ventilation therapy by monitoring diagnostic test results  Outcome: Not Met

## 2022-03-05 NOTE — ASSESSMENT & PLAN NOTE
Patient admitted with a requirement of 6L to maintain saturation  He was found to have a PE and consolidation in the left lung  CT CTA was done showing bilateral PE and Consolidation and volume loss present in much of the left lower lobe   He is currently on heparin and on cefepime and vancomycin

## 2022-03-05 NOTE — ASSESSMENT & PLAN NOTE
This is Septic shock Present on admission  SIRS criteria identified on my evaluation include: Tachycardia, with heart rate greater than 90 BPM, Tachypnea, with respirations greater than 20 per minute and Leukocytosis, with WBC greater than 12,000  Source is likely staph bacteremia with septic emboli  Presentation includes: Severe sepsis present, persistent hypotension after 30 ml/kg completed, and initial lactate level result is >= 4 mmol/L.   Patient was transferred to ICU for pressor control. [facility], and he underwent treatment with adherence to his meds  Current, the covering is cefepime and vancomycin and awaiting for blood cultures

## 2022-03-05 NOTE — PROGRESS NOTES
[facility] ICU Progress Note      Admit Date: 3/4/2022    Resident(s): Mary Lucas M.D.   Attending:  Dr. Richardson Workman    Patient ID:    Name:  Adan Espinosa     YOB: 1945  Age:  76 y.o.  male   MRN:  [id number]    Hospital Course (carried forward and updated):  Adan Espinosa is a 76 y.o. male with known history of lumbabr fusion followed with a complicated MSSA associated infection requiring a washout in Dec 2021 with bacteremia with CVA without endocarditis in Jan 2022, HTN, HLD, that was admitted on  03/04/2022 for altered mental status and found via head brain MRI, pan CT and extremity ultrasound to have multiple level of thrombi likely secondary to septic emboli.    03/05: Awaiting for echo. Troponin increasing. Was started on heparin after weighing risks and benefirs. Discussed with family and code status change to DNR with intubation. Vascular and IR recommended against any surgical intervention.    Consultants:  Critical Care  IR  Vascular surgery    Vitals Range last 24h:  Temp:  [36.1 °C (97 °F)-36.4 °C (97.6 °F)] 36.1 °C (97 °F)  Pulse:  [] 56  Resp:  [18-57] 18  BP: ()/(47-95) 133/86  SpO2:  [89 %-100 %] 98 %      Intake/Output Summary (Last 24 hours) at 3/5/2022 1353  Last data filed at 3/5/2022 1200  Gross per 24 hour   Intake 3534.94 ml   Output 1075 ml   Net 2459.94 ml        Review of Systems   Unable to perform ROS: Intubated        PHYSICAL EXAM:  Vitals:    03/05/22 1230 03/05/22 1245 03/05/22 1300 03/05/22 1315   BP: 131/88 140/92 134/82 133/86   Pulse: (!) 59 (!) 53 (!) 59 (!) 56   Resp: 18 18 18 18   Temp:       TempSrc:       SpO2: 98% 98% 98% 98%   Weight:       Height:        Body mass index is 26.38 kg/m².    O2 therapy: Pulse Oximetry: 98 %, O2 (LPM): 60, O2 Delivery Device: Ventilator    Date 03/05/22 0700 - 03/06/22 0659   Shift [id number] 1032-6669 6844-0436 24 Hour Total   INTAKE   I.V. 338.4   338.4     Precedex Volume 68.4   68.4     Norepinephrine  Volume 67.8   67.8     Heparin Volume 142.8   142.8     Volume (mL) (3% sodium chloride (HYPERTONIC SALINE) 500mL infusion) 59.3   59.3   Shift Total 338.4   338.4   OUTPUT   Urine 325   325     Output (mL) (Urethral Catheter Temperature probe) 325   325   Stool 0   0     Number of Times Stooled 0 x   0 x     Measurable Stool (mL) 0   0   Shift Total 325   325   NET 13.4   13.4        Physical Exam  Constitutional:       General: He is in acute distress.      Appearance: He is ill-appearing and diaphoretic.   HENT:      Head: Normocephalic.      Nose: Nose normal.      Mouth/Throat:      Mouth: Mucous membranes are moist.   Eyes:      Pupils: Pupils are equal, round, and reactive to light.   Neck:      Comments: Unable to assess  Cardiovascular:      Rate and Rhythm: Normal rate and regular rhythm.      Pulses: Normal pulses.      Heart sounds: No murmur heard.  Pulmonary:      Effort: Pulmonary effort is normal.      Breath sounds: Normal breath sounds. No wheezing or rales.   Abdominal:      General: There is no distension.      Palpations: Abdomen is soft.      Tenderness: There is no abdominal tenderness.   Musculoskeletal:      Right lower leg: No edema.      Left lower leg: No edema.   Skin:     General: Skin is warm.   Neurological:      Mental Status: He is alert.      Comments: Unable to assess   Psychiatric:      Comments: Unable to assess       Recent Labs     03/05/22  0001 03/05/22  0902   ISTATAPH 7.332*  --    ISTATAPCO2 47.9*  --    ISTATAPO2 172*  --    ISTATATCO2 27  --    [id number] 99  --    ISTATARTHCO3 25.4*  --    ISTATARTBE -1  --    ISTATTEMP 97.5 F 97.2 F   ISTATFIO2 100 40   ISTATSPEC Arterial Venous   ISTATAPHTC 7.341*  --    [id number] 168*  --      Recent Labs     03/04/22  2247 03/05/22  0105 03/05/22  0600 03/05/22  1314   SODIUM 137 135 134* 137   POTASSIUM 4.4 4.5 4.4  --    CHLORIDE 101 100 101  --    CO2 20 23 20  --    BUN 25* 26* 26*  --    CREATININE 1.15 1.11 1.01  --     CALCIUM 9.3 9.3 9.0  --      Recent Labs     03/04/22 2247 03/05/22  0105 03/05/22  0600   ALTSGPT 15 14 19   ASTSGOT 17 19 26   ALKPHOSPHAT 86 92 85   TBILIRUBIN 0.4 0.6 0.4   GLUCOSE 220* 217* 181*     Recent Labs     03/04/22 2247 03/05/22  0105 03/05/22  0600 03/05/22  0840   RBC 3.61*  --  3.69* 3.69*   HEMOGLOBIN 9.9*  --  10.1* 10.2*   HEMATOCRIT 31.7*  --  32.2* 31.7*   PLATELETCT 301  --  301 276   PROTHROMBTM  --  16.2*  --  17.1*   APTT  --  30.5  --  65.8*   INR  --  1.35*  --  1.44*     Recent Labs     03/04/22 2247 03/05/22 0105 03/05/22  0600 03/05/22  0840   WBC 14.3*  --  14.2* 15.2*   NEUTSPOLYS 82.50*  --  82.60* 84.80*   LYMPHOCYTES 8.70*  --  10.10* 7.10*   MONOCYTES 7.00  --  6.30 6.70   EOSINOPHILS 0.90  --  0.10 0.60   BASOPHILS 0.30  --  0.40 0.30   ASTSGOT 17 19 26  --    ALTSGPT 15 14 19  --    ALKPHOSPHAT 86 92 85  --    TBILIRUBIN 0.4 0.6 0.4  --        Meds:  • MD Alert...Vancomycin per Pharmacy       • cefepime  2 g Stopped (03/05/22 1305)   • vancomycin  750 mg 750 mg (03/05/22 1300)   • Respiratory Therapy Consult       • famotidine  20 mg      Or   • famotidine  20 mg     • senna-docusate  2 Tablet      And   • polyethylene glycol/lytes  1 Packet      And   • magnesium hydroxide  30 mL      And   • bisacodyl  10 mg     • MD Alert...Adult ICU Electrolyte Replacement per Pharmacy       • lidocaine  2 mL     • fentaNYL  50 mcg      And   • fentaNYL  100 mcg      And   • fentaNYL   100 mcg/hr (03/05/22 0709)    And   • dexmedetomidine (Precedex) infusion  0-0.7 mcg/kg/hr 0.5 mcg/kg/hr (03/05/22 1239)   • insulin regular  1-6 Units      And   • dextrose bolus  25 g     • heparin  0-30 Units/kg/hr 14 Units/kg/hr (03/05/22 0944)   • hydrALAZINE  10 mg     • 3% sodium chloride   40 mL/hr at 03/05/22 1031   • lidocaine  20 mL     • norepinephrine (Levophed) infusion  0-30 mcg/min 6 mcg/min (03/05/22 0711)        Procedures:  None     Imaging:  US-EXTREMITY ARTERY LOWER BILAT   Final  Result      EC-ECHOCARDIOGRAM LTD W/ CONT         US-EXTREMITY ARTERY UPPER UNILAT LEFT   Final Result      US-EXTREMITY VENOUS UPPER UNILAT LEFT   Final Result      US-EXTREMITY VENOUS LOWER BILAT   Final Result      MR-LUMBAR SPINE-WITH & W/O   Final Result      1.  Abnormal T2 fluid signal intensity in the L1, L2 and to a lesser extent the L5-S1 vertebral disc spaces. There is no definite evidence of adjacent marrow edema signal or enhancement in the opposing endplates. Early discitis at these levels cannot be    excluded. Short interval imaging follow-up is recommended as clinically appropriate.   2.  There is diffuse enhancement in the surgical bed extending to the level of the dorsal thecal sac. There is no definite evidence of epidural fluid collection or abscess. Evaluation of the postoperative levels is somewhat limited by hardware magnetic    susceptibility artifact.   3.  Postsurgical changes of the lumbar spine with laminectomy defects extending from L2 to S1 right posterior hemifusion hardware at L4-5 and lateral fusion hardware L4-5. A disc spacer device appears present at L4-5.   4.  Multilevel degenerative changes of the lumbar spine as described above.      MR-BRAIN-WITH & W/O   Final Result      1.  Moderate to large sized acute to subacute infarcts in the left anterior cerebral artery territory, left parietal, right parietal, bilateral occipital lobes and right cerebellum. Patchy areas of enhancement are noted in the left anterior cerebral    artery territory and right cerebellum which is more consistent with subacute chronicity. There is no evidence of hemorrhagic transformation.   2.  Chronic right frontal lobe and bilateral cerebellar infarcts.   3.  Mild diffuse cerebral substance loss.   4.  Moderate microangiopathic ischemic change versus demyelination or gliosis.      CT-CTA NECK WITH & W/O-POST PROCESSING   Final Result      CT angiogram of the neck within normal limits.      CT-CTA HEAD  WITH & W/O-POST PROCESS   Final Result      CT angiogram of the [place] within normal limits.      CT-CEREBRAL PERFUSION ANALYSIS   Final Result      1.  Cerebral blood flow less than 30% likely representing completed infarct = 0 mL.      2.  T Max more than 6 seconds likely representing combination of completed infarct and ischemia = 27 mL.      3.  Mismatched volume likely representing ischemic brain/penumbra = 27      4.  Please note that the cerebral perfusion was performed on the limited brain tissue around the basal ganglia region. Infarct/ischemia outside the CT perfusion sections can be missed in this study.      CT-ABDOMEN-PELVIS WITH   Final Result      1.  Several limited areas of decreased enhancement in the spleen are present consistent with splenic infarcts.      2.  Renal infarcts also appear to be present in the parenchyma of the left kidney.      3.  Tubular filling defect consistent with thrombus is identified in the aorta as described above.      4.  Some wall thickening appears to be present in the cecum which is not fully distended. This could be due to lack of distention or spasm although colitis is also possible.      5.  Consolidation and volume loss in the left lung base.      CT-HEAD W/O   Final Result         1.  Brain swelling and edema and right cerebellar hemisphere is noted which could indicate subacute infarct.      2.  Evidence of previous right frontal lobe infarct with encephalomalacia.      3.  No intracranial hemorrhage.      Decreased attenuation in the cerebral white matter likely indicates microvascular ischemic disease.         CT-CTA CHEST PULMONARY ARTERY W/ RECONS   Final Result      1.  Exam is positive for significant pulmonary emboli bilaterally.      2.  Abnormally elevated RV LV ratio is present.      3.  Consolidation and volume loss present in much of the left lower lobe could be due to pneumonia. Pulmonary infarct is also possible.      These findings were  discussed with BON FALCON on 03/05/2022. Abdominal CT results also discussed..      DX-CHEST-PORTABLE (1 VIEW)   Final Result         1.  Right central line noted with tip at the level of the SVC. No pneumothorax identified. Endotracheal tube is noted in place.      DX-CHEST-PORTABLE (1 VIEW)   Final Result         1.  Elevation of left hemidiaphragm and probable atelectasis in the left lung base. Developing pneumonitis or pneumonia is also possible.      2.  No other infiltrates or consolidations identified.          ASSESSEMENT and PLAN:    * Septic shock (HCC)  Assessment & Plan  This is Septic shock Present on admission  SIRS criteria identified on my evaluation include: Tachycardia, with heart rate greater than 90 BPM, Tachypnea, with respirations greater than 20 per minute and Leukocytosis, with WBC greater than 12,000  Source is likely staph bacteremia with septic emboli  Presentation includes: Severe sepsis present, persistent hypotension after 30 ml/kg completed, and initial lactate level result is >= 4 mmol/L.   Patient was transferred to ICU for pressor control. [facility] and he underwent treatment with adherence to his meds  Current, the covering is cefepime and vancomycin and awaiting for blood cultures      Cerebrovascular accident (CVA) due to embolism (HCC)  Assessment & Plan  Patient was admitted with AMS and obtunded  CT brain, MRI, and CTA of neck consistent with multiple sub infarcts suspected to be secondary to septic embolism  It is unknown if patient had a previously found patent foramen ovale or if a valvular vegetation has occurred in the past to result in a multi-level of embolism  Another cause could be emboli from thrombi found into the aorta  COVID antibodies have been negative  Patient was started on heparin after discussion of risks and benefits with family and the possible complication of hemorrhagic transformation.   The window is very  small in regard of option given the bilateral PE and thrombi in the spleen and kidneys  Close follow up with Q2H neuro check  Awaiting for echo     Pulmonary embolism (HCC)  Assessment & Plan  Patient was found to have bilateral embolism in the lungs.  This has been thought to be secondary to septic embolism  The issue has been with the concomitant thrombi found in the brain, and the likelihood of hemorrhagic transformation  Risks and benefits were assessed and discussed with family  Vascular surgery and IR were consulted and agreed that despite patient being hemodynamically unstable, there is a high risk of bleeding should we go the tPA route or thromboembolectomy   We elected to start with heparin and to watch for hemorrhage that is a high possible complication.    Acute respiratory failure with hypoxia (HCC)  Assessment & Plan  Patient admitted with a requirement of 6L to maintain saturation  He was found to have a PE and consolidation in the left lung  CT CTA was done showing bilateral PE and Consolidation and volume loss present in much of the left lower lobe   He is currently on heparin and on cefepime and vancomycin    Septic embolism (HCC)  Assessment & Plan  Patient with clots noted in both venous (both PE, and stroke), also with arterial thrombus in aorta and renal and splenic infarcts  With history of continued MSSA bacteremia post surgery with hardware infection very likely patient still has staph bacteremia which has led to septic emboli and infarcts  On appropriate antibiotics  Follow-up blood cultures  Likely Will need an ID consult in a.m.  Anticoagulation recommended for aortic thrombus and PE, however with multiple septic emboli strokes of different ages high risk for hemorrhagic conversion  Follow-up brain and lumbar MRI  TTE with bubble study ordered(as clots on both side of the heart) possible PFO, also to evaluate for endocarditis, patient will very likely need SALVATORE later in hospitalization.        Renal infarct (HCC)  Assessment & Plan  Likely due to septic emboli possibly from aortic thrombus  Monitor renal function    Aortic thrombus (HCC)  Assessment & Plan  Aortic thrombus 1.2 cm diameter-6 cm length and abdominal aorta above the level of renal arteries  With patient's history of septic emboli and infection, likely this is a septic thrombus  Complicated by renal and splenic infarcts  Discussed with vascular surgery Dr. Bai, no acute surgery needs, will need to evaluate patient's status from PE and stroke and determine if aortic surgery is indicated, will see in the a.m.  Was also discussed with IR, who stated possible they could help with intervention however need vascular evaluation prior    Encephalopathy  Assessment & Plan  This likely to be secondary to septic shock and with the moderate size subacute infarct found on imaging. There are just too many areas on hyperdensity that would correspond to brain injury that could jeopardize any future functional activity  This is ultimately associated with septic embolism secondary to infection that is also a main cause of AMS  Other conditions such as TSH, vitamin def are unlikely but will rule them out as an AMS workup.  Prognosis  Appears grim, but in the meantime, we'll continue antibiotics and heparin as described in other sections of this problem list      Wound infection complicating hardware, sequela  Assessment & Plan  Patient had lumbar fusion in November 2021, this was complicated in December by a MSSA paraspinal infection likely including hardware , requiring wound washout and long course of antibiotics including rifampin at home  Patient had recurrent bacteremia in January-however could not find evidence that lumbar spine was evaluated at that time again.  Per wife patient has pain intermittently at site, but in the last few days has had increasing pain and some swelling around the site  MRI of lumbar spine ordered  We will discuss with  spine surgery when results are available  On Vanco and cefepime    S/P lumbar fusion  Assessment & Plan  Lumbar fusion for lumbar stenosis with neurologic claudication in November 2021 with multiple complications as above and below      Splenic infarct  Assessment & Plan  Multiple embolic infarcts noted, likely secondary to aortic thrombus/septic emboli      DISPO: Prognosis is grim with  These multiple embolism. Started on heparin with risk on hemorrhagic transformation that will have to be watched closely    CODE STATUS: DNR, I ok    Quality Measures:  Feeding: NPO  Analgesia: None  Sedation: fentanyl and precedex  Thromboprophylaxis: heparin  Head of bed: >30 degrees  Ulcer prophylaxis: Famotidine  Glycemic control: Correctional: Not needed  Bowel care: bowel regimen PRn  Indwelling lines: Arterial line and   Deescalation of antibiotics: Currently on cefepime and vancomycin      Mary Lucas M.D.

## 2022-03-05 NOTE — ASSESSMENT & PLAN NOTE
Patient was found to have bilateral embolism in the lungs.  This has been thought to be secondary to septic embolism  The issue has been with the concomitant thrombi found in the brain, and the likelihood of hemorrhagic transformation  Risks and benefits were assessed and discussed with family  Vascular surgery and IR were consulted and agreed that despite patient being hemodynamically unstable, there is a high risk of bleeding should we go the tPA route or thromboembolectomy   We elected to start with heparin and to watch for hemorrhage transformation in the brain that is a high possible complication.

## 2022-03-05 NOTE — PROGRESS NOTES
Pharmacy Vancomycin Kinetics Note for 3/5/2022     76 y.o. male on Vancomycin day # 1     Vancomycin Indication (AUC Dosing): Bacteremia       Provider specified end date: 03/12/22    Active Antibiotics (From admission, onward)    Ordered     Ordering Provider       Additional Orders    03/04/22 2300  PIPERACILLIN SOD-TAZOBACTAM SO 3.375 (3-0.375) G IV SOLR        Note to Pharmacy: Kaleb Knutson: cabinet override           Sat Mar 5, 2022 12:30 AM    03/05/22 0030  cefepime (Maxipime) 2 g in dextrose 5% 20 mL IV syringe  EVERY 8 HOURS         Arminda Amador M.D.    03/05/22 0030  MD Alert...Vancomycin per Pharmacy  PHARMACY TO DOSE        Question:  Indication(s) for vancomycin?  Answer:  Staphylococcus aureus bacteremia    Arminda Amador M.D.       Fri Mar 4, 2022 11:00 PM    03/04/22 2300  vancomycin (VANCOCIN) 2,750 mg in  mL IVPB  (vancomycin (VANCOCIN) IV (LD + Maintenance))  ONCE         Jeremy Talavera M.D.          Dosing Weight: 93.2 kg (205 lb 7.5 oz)      Admission History: Admitted on 3/4/2022 for Acute hypercapnic respiratory failure (HCC) [J96.02]  Pertinent history: 76M with past spinal surgery (11/21) complicated by infection.  s/p I&D in December, and presumed MSSA cultures at [facility].  Patient is septic on arrival with tachycardia,tachypnea, hypertension and leukocytosis. Patient was intubated in ER and admitted to ICU    Allergies:     Patient has no known allergies.     Pertinent cultures to date:     Results     Procedure Component Value Units Date/Time    CoV-2, FLU A/B, and RSV by PCR (2-4 Hours CEPHEID) : Collect NP swab in VTM [[id number]] Collected: 03/05/22 0115    Order Status: Completed Specimen: Respirate from Nasopharyngeal Updated: 03/05/22 0205     SARS-CoV-2 Source NP Swab    URINALYSIS [[id number]]  (Abnormal) Collected: 03/04/22 2333    Order Status: Completed Specimen: Urine Updated: 03/05/22 0006     Color Yellow     Character Clear     Specific Gravity  "1.023     Ph 6.0     Glucose Negative mg/dL      Ketones Negative mg/dL      Protein 30 mg/dL      Bilirubin Negative     Urobilinogen, Urine 1.0     Nitrite Negative     Leukocyte Esterase Negative     Occult Blood Negative     Micro Urine Req Microscopic    Narrative:      Indication for culture:->Emergency Room Patient    URINE CULTURE(NEW) [[id number]] Collected: 22    Order Status: Sent Specimen: Urine Updated: 22    Narrative:      Indication for culture:->Emergency Room Patient    BLOOD CULTURE [[id number]] Collected: 22    Order Status: Sent Specimen: Blood from Peripheral Updated: 22    Narrative:      Per Hospital Policy: Only change Specimen Src: to \"Line\" if  specified by physician order.    BLOOD CULTURE [[id number]]     Order Status: Sent Specimen: Blood from Peripheral           Labs:     Estimated Creatinine Clearance: 64.8 mL/min (by C-G formula based on SCr of 1.11 mg/dL).  Recent Labs     22   WBC 14.3*   NEUTSPOLYS 82.50*     Recent Labs     22  0105   BUN 25* 26*   CREATININE 1.15 1.11   ALBUMIN 3.1* 3.0*     No intake or output data in the 24 hours ending 22 0218   BP (!) 166/95   Pulse (!) 137   Temp 36.1 °C (97 °F) (Temporal)   Resp (!) 30   Ht 1.88 m (6' 2\")   Wt 93.2 kg (205 lb 7.5 oz)   SpO2 100%  Temp (24hrs), Av.3 °C (97.3 °F), Min:36.1 °C (97 °F), Max:36.4 °C (97.6 °F)      List concerns for Vancomycin clearance:     Age;BUN/Scr ratio greater than 20:1;Nephrotoxic drugs;Pressors/Hypotension    Pharmacokinetics:     AUC kinetics:   Ke (hr ^-1): 0.0582 hr^-1  Half life: 11.91 hr  Clearance: 3.526  Estimated TDD: 1763  Estimated Dose: 1021  Estimated interval: 13.9        A/P:     -  Vancomycin dose: Load: 2750mg 3/ 0115     Maintenance: 750mg Q12h ()    -  Next vancomycin level(s):    Not ordered.  Recommended at 4th/5th dose.     -  Predicted vancomycin AUC from initial AUC test calculator: " 425 mg·hr/L        -  Comments: Empirically treating with Zosyn/Vanco. Several concerns for accumulation. Pharmacy will CTM and deescalate as appropriate.     Rowena Matson, PharmD

## 2022-03-05 NOTE — ED NOTES
Pt to CT scanner with RT and this RN, monitor in place. CT head and CTA with PE study performed. RICU called and met this RN in CT. Bedside report given to Bishop RN. Pt transferred to RICU 111 with monitor in place and RT. Care released.

## 2022-03-05 NOTE — ED PROVIDER NOTES
ED Provider Note        Primary care provider: No primary care provider on file.    I verified that the patient was wearing a mask and I was wearing appropriate PPE every time I entered the room. The patient's mask was on the patient at all times during my encounter except for a brief view of the oropharynx.      CHIEF COMPLAINT  Chief Complaint   Patient presents with   • ALOC     Pt BIBA from home after being altered per wife for the last hour. Pt found to be 86% on RA and was placed on 6 L n/c. Pt [facility] and had post-op complication and placed on antibiotics which he's been compliant with. Pt responsive to painful stimuli on arrival. GCS 9. . Pt BP 90/57 on arrival.        HPI  Adan Espinosa is a 76 y.o. male who presents to the Emergency Department with chief complaint of altered mental status.  Patient had a very complicated recent medical history he had decompressive lumbar back surgery in November with hardware placed.  In December he had of surgery for drainage of infection in this area.  He was on a PICC line for several weeks was admitted to rehab for several weeks.  He has been home for the last couple weeks deteriorating over the last couple days wife and states progressive altered mental status last couple days.  Did participate in physical therapy yesterday but has been fairly sedentary throughout the day with progressive altered mental status today.  I states he has been eating normally he has not been drinking enough fluids has had normal urination and stooling.  This evening he became progressively less responsive which is when EMS was called.  EMS reports a GCS of 9 on their arrival blood sugar of 193 initial blood pressure 90/57.  He was hypoxic in the low 80s on EMS arrival placed on 6 L by nasal cannula with saturations in the low 90s on arrival.  Further history of present illness is limited altered mental status and critical presentation.    REVIEW  "OF SYSTEMS  10 systems reviewed and otherwise negative, pertinent positives and negatives listed in the history of present illness.    Past medical surgical social family history meds and allergies obtained by chart review and discussion with family members as patient is obtunded    PAST MEDICAL HISTORY   Recent MSSA sepsis secondary due to back infection, several spine surgeries.    SURGICAL HISTORY  patient denies any surgical history    SOCIAL HISTORY  Social History     Tobacco Use   • Smoking status: Unknown If Ever Smoked      Social History     Substance and Sexual Activity   Drug Use Not on file       FAMILY HISTORY  Non-Contributory    CURRENT MEDICATIONS  Home Medications    **Home medications have not yet been reviewed for this encounter**         ALLERGIES  No Known Allergies    PHYSICAL EXAM  VITAL SIGNS: BP (!) 90/53   Pulse 99   Temp 36.4 °C (97.6 °F) (Temporal)   Resp (!) 22   Ht 1.88 m (6' 2\")   Wt 111 kg (245 lb)   SpO2 99%   BMI 31.46 kg/m²   Pulse ox interpretation: Hypoxic despite 6 L via nasal cannula  Constitutional: Somnolent disoriented severe distress  HEENT: Atraumatic normocephalic, pupils are 3 mm sluggishly reactive, extraocular movements are intact. The nares is clear, external ears are normal, mouth shows dry mucous membranes  Neck: no obvious JVD or tracheal deviation  Cardiovascular: Tachycardic no murmur rub or gallop, thready pulses in the periphery  Thorax & Lungs: Tachypneic, crackles throughout decreased air movement throughout  GI: nondistended positive bowel sounds  Skin: Decreased skin turgor  Musculoskeletal: Moving all extremities to noxious stimuli  Neurologic: GCS 7,  [id number]  Psychiatric: Somnolent minimal response to noxious stimuli      DIAGNOSTIC STUDIES / PROCEDURES  LABS    Results for orders placed or performed during the hospital encounter of 03/04/22   LACTIC ACID   Result Value Ref Range    Lactic Acid 2.3 (H) 0.5 - 2.0 mmol/L   CBC WITH DIFFERENTIAL "   Result Value Ref Range    WBC 14.3 (H) 4.8 - 10.8 K/uL    RBC 3.61 (L) 4.70 - 6.10 M/uL    Hemoglobin 9.9 (L) 14.0 - 18.0 g/dL    Hematocrit 31.7 (L) 42.0 - 52.0 %    MCV 87.8 81.4 - 97.8 fL    MCH 27.4 27.0 - 33.0 pg    MCHC 31.2 (L) 33.7 - 35.3 g/dL    RDW 47.3 35.9 - 50.0 fL    Platelet Count 301 164 - 446 K/uL    MPV 9.4 9.0 - 12.9 fL    Neutrophils-Polys 82.50 (H) 44.00 - 72.00 %    Lymphocytes 8.70 (L) 22.00 - 41.00 %    Monocytes 7.00 0.00 - 13.40 %    Eosinophils 0.90 0.00 - 6.90 %    Basophils 0.30 0.00 - 1.80 %    Immature Granulocytes 0.60 0.00 - 0.90 %    Nucleated RBC 0.00 /100 WBC    Neutrophils (Absolute) 11.80 (H) 1.82 - 7.42 K/uL    Lymphs (Absolute) 1.24 1.00 - 4.80 K/uL    Monos (Absolute) 1.00 (H) 0.00 - 0.85 K/uL    Eos (Absolute) 0.13 0.00 - 0.51 K/uL    Baso (Absolute) 0.05 0.00 - 0.12 K/uL    Immature Granulocytes (abs) 0.08 0.00 - 0.11 K/uL    NRBC (Absolute) 0.00 K/uL   COMP METABOLIC PANEL   Result Value Ref Range    Sodium 137 135 - 145 mmol/L    Potassium 4.4 3.6 - 5.5 mmol/L    Chloride 101 96 - 112 mmol/L    Co2 20 20 - 33 mmol/L    Anion Gap 16.0 7.0 - 16.0    Glucose 220 (H) 65 - 99 mg/dL    Bun 25 (H) 8 - 22 mg/dL    Creatinine 1.15 0.50 - 1.40 mg/dL    Calcium 9.3 8.5 - 10.5 mg/dL    AST(SGOT) 17 12 - 45 U/L    ALT(SGPT) 15 2 - 50 U/L    Alkaline Phosphatase 86 30 - 99 U/L    Total Bilirubin 0.4 0.1 - 1.5 mg/dL    Albumin 3.1 (L) 3.2 - 4.9 g/dL    Total Protein 6.6 6.0 - 8.2 g/dL    Globulin 3.5 1.9 - 3.5 g/dL    A-G Ratio 0.9 g/dL   URINALYSIS    Specimen: Urine   Result Value Ref Range    Color Yellow     Character Clear     Specific Gravity 1.023 <1.035    Ph 6.0 5.0 - 8.0    Glucose Negative Negative mg/dL    Ketones Negative Negative mg/dL    Protein 30 (A) Negative mg/dL    Bilirubin Negative Negative    Urobilinogen, Urine 1.0 Negative    Nitrite Negative Negative    Leukocyte Esterase Negative Negative    Occult Blood Negative Negative    Micro Urine Req Microscopic    C  Reactive Protein Quantitative (Non-Cardiac)   Result Value Ref Range    Stat C-Reactive Protein 12.69 (H) 0.00 - 0.75 mg/dL   proBrain Natriuretic Peptide, NT   Result Value Ref Range    NT-proBNP 318 (H) 0 - 125 pg/mL   TROPONIN   Result Value Ref Range    Troponin T 32 (H) 6 - 19 ng/L   ESTIMATED GFR   Result Value Ref Range    GFR If African American >60 >60 mL/min/1.73 m 2    GFR If Non African American >60 >60 mL/min/1.73 m 2   URINE MICROSCOPIC (W/UA)   Result Value Ref Range    WBC 0-2 (A) /hpf    RBC 0-2 (A) /hpf    Bacteria Negative None /hpf    Epithelial Cells Negative /hpf    Hyaline Cast 0-2 /lpf   EKG   Result Value Ref Range    Report       [facility]    Test Date:  2022  Pt Name:    ANISA STRINGER               Department: ER  MRN:        [id number]                      Room:       [facility]  Gender:     Male                         Technician: [id number]  :        [id number]                   Requested By:KORTNEY YOO  Order #:    [id number]                    Reading MD: KORTNEY YOO MD    Measurements  Intervals                                Axis  Rate:       89                           P:          74  NH:         227                          QRS:        -15  QRSD:       95                           T:          -11  QT:         348  QTc:        421    Interpretive Statements  Sinus rhythm  Atrial premature complex  Prolonged NH interval  Inferior infarct, old  Consider anterior infarct  No previous ECG available for comparison  Electronically Signed On 3-5-2022 0:16:56 PST by KORTNEY YOO MD     POCT glucose device results   Result Value Ref Range    POC Glucose, Blood 193 (H) 65 - 99 mg/dL       All labs reviewed by me.      RADIOLOGY    Bedside Rush exam shows a hyperdynamic heart with normal ejection fraction no pericardial effusion the IVC is flattened greater than 50% collapse with inspiration there is no intraperitoneal free fluid  there is no obvious aortic dilatation or dissection.  B-lines in the inferior lung bases bilaterally no pneumothoraces apically.    DX-CHEST-PORTABLE (1 VIEW)   Final Result         1.  Right central line noted with tip at the level of the SVC. No pneumothorax identified. Endotracheal tube is noted in place.      DX-CHEST-PORTABLE (1 VIEW)   Final Result         1.  Elevation of left hemidiaphragm and probable atelectasis in the left lung base. Developing pneumonitis or pneumonia is also possible.      2.  No other infiltrates or consolidations identified.      CT-HEAD W/O    (Results Pending)   MR-BRAIN-WITH & W/O    (Results Pending)   MR-LUMBAR SPINE-WITH & W/O    (Results Pending)   CT-CHEST,ABDOMEN,PELVIS W/O    (Results Pending)         COURSE & MEDICAL DECISION MAKING  Pertinent Labs & Imaging studies reviewed. (See chart for details)      Patient seen and examined at bedside upon EMS arrival..  Patient presents appears critically ill tachycardic hypotensive altered mental status.  Patient was discussed with intensivist Dr. Amador almost immediately after arrival.  I also discussed the case with the patient's spouse.  Spouse does voice that he would like full medical treatment and would like intubation/CPR if he required this.  Patient was initially placed on high flow nasal cannula he was oxygenating with this well but he is very tachypneic altered not ventilating at all decreased air movement throughout all lung fields.  Patient was intubated for airway protection as well as to facilitate ventilation.  He had central line placed.  Prior to intubation had full labs obtained including blood cultures and lactic acid following blood cultures we initiated vancomycin and Zosyn due to his presentation and past medical history.  He had borderline pressures during intubation therefore well the central line was placed he was given Versed push and then following line placement was initiated on Levophed and propofol.   Orders have been placed for CT head.  Intensivist is also ordered CT abdomen pelvis to evaluate for any abdominal pathology.  Patient will be kept on for full medical support at this time he is admitted to the ICU in critical condition.    Patient was given IV fluids based on tachycardia hypotension and sepsis, oral hydration was not attempted due to insufficiency for hydration, after fluids had improvement of blood pressure        Intubation Procedure Note    Indication: impending respiratory failure, airway compromise, comatose state, hypoxia, hypercarbia and airway protection    Consent: The spouse was counseled regarding the procedure, its indications, risks, potential complications and alternatives, and any questions were answered. Consent was obtained to proceed.    Medications Used: ketamine intravenously and rocuronium intravenously    Procedure: The patient was placed in the appropriate position.  Cricoid pressure was not required.  Intubation was performed by direct laryngoscopy using a laryngoscope and a 8.0 cuffed endotracheal tube.  The cuff was then inflated and the tube was secured appropriately at a distance of 25 cm to the dental ridge.  Initial confirmation of placement included bilateral breath sounds, an end tidal CO2 detector, absence of sounds over the stomach, tube fogging, adequate chest rise, adequate pulse oximetry reading, improved skin color and rectal visualization.  A chest x-ray to verify correct placement of the tube showed appropriate tube position.    The patient tolerated the procedure well.     Complications: None        Central Line Placement Procedure Note  Indication: vascular access, hypovolemia, long term access, central venous monitoring, centrally administered medications and need for frequent blood draws    Consent: The spouse was counseled regarding the procedure, its indications, risks, potential complications and alternatives, and any questions were answered. Consent  "was obtained to proceed.    Procedure: The patient was positioned appropriately and the skin over the right internal jugular vein was prepped with chlorhexidine. Local anesthesia was not performed due to the emergent nature of this procedure.  A large bore needle was used to identify the vein under dynamic ultrasound guidance..  A guide wire was then inserted into the vein through the needle. A triple lumen catheter was then inserted into the vessel over the guide wire using the Seldinger technique.  All ports showed good, free flowing blood return and were flushed with saline solution.  The catheter was then securely fastened to the skin with sutures and covered with a sterile dressing.  A post procedure X-ray was ordered and showed good line position.    The patient tolerated the procedure well.    Complications: None    Critical care:  45 minutes of critical care time was spent with this patient including initial evaluation initial resuscitation the ordering of labs EKGs images and rhythm strip interpretation of such. Interventions based on such. Discussing the case with EMS, the patient's wife, nursing staff, respiratory therapy, and consulting physicians. Does not include separately billable procedures.    BP (!) 90/53   Pulse 99   Temp 36.4 °C (97.6 °F) (Temporal)   Resp (!) 22   Ht 1.88 m (6' 2\")   Wt 111 kg (245 lb)   SpO2 99%   BMI 31.46 kg/m²             FINAL IMPRESSION  1.  Altered mental status  2.  Hypoxia  3.  Sepsis  4.  Leukocytosis  5.  Elevated CRP  6.  Hypercapnic respiratory failure  7.  Intubation by ERP  8.  Central line by ERP  9.  Critical care 45 minutes      This dictation has been created using voice recognition software and/or scribes. The accuracy of the dictation is limited by the abilities of the software and the expertise of the scribes. I expect there may be some errors of grammar and possibly content. I made every attempt to manually correct the errors within my dictation. " However, errors related to voice recognition software and/or scribes may still exist and should be interpreted within the appropriate context.

## 2022-03-05 NOTE — NON-PROVIDER
"CRITICAL CARE PROGRESS NOTE    Date & Time note created:    3/5/2022   10:43 AM       Patient ID:   Name: Adan Espinosa  .  YOB: 1945. Age: 76 y.o. male.  MRN: [id number]    PCP : No primary care provider on file.    CC:   Altered mental status    HOSPITAL COURSE    Adan Espinosa is a 76 y.o. male with a past medical [facility], this was complicated by an MSSA surgical site infection requiring washout 12/9/202. Pt presented to [facility] on 1/6 with chief complaint of confusion and was admitted. MRI there showed multiple small subacute and acute infarcts. SALVATORE on 1/18/2022 showed normal valves, no vegetation, but was positive for a PFO. MSSA infection treated there with cefazolin and then discharged on 1/19 with keflex for long term suppression. On day of admission pt's wife called EMS for acute altered mental status, initial GCS of 9 which declined to 5 in the ED and pt was intubated.     CTA of the chest showed bilateral PEs, a possible LLL pneumonia vs. pulmonary infarction, and an increased RV to LV ratio of 1.6. CT head w/o showed \"Brain swelling and edema and right cerebellar hemisphere\" no evidence of hemorrhage. CT abdomen, pelvis with contrast showed splenic infarct, left renal infarct, aortic thrombus, and cecum wall thickening. CTA head and neck without large vessel occlusion.  MRI showed no evidence of hemorrhagic transformation. MRI of lumbar spine showed no abscess or epidural fluid collection. Extremity US revealed right leg popliteal artery thrombosis and DVT, and left leg DVT.    Neurology was consulted and recommended against TPA due to high risk of hemhorragic conversion, recommended for heparin drip without boluses and a SBP maintained 110-180. Patient's case was also discussed with IR for massive PE, it was determined that he was not a candidate for EKOS or thrombectomy at this time but could be considered if his hemodynamic " status were to worsen. Vascular surgery was also consulted and per Dr. Bai no indication for aortic thrombectomy. Agrees with systemic anticoagulation with heparin.    Interval Updates  3/5: Per Dr. Bai,long discussion was had with the pt's family regarding risks and benefits of systemic anticoagulation and they agree to proceed with heparin infusion. Family elected to change pt code status to DNR/IOK. Pt is intubated, no gag or cough reflex, corneal reflex present.    Consults:  Neurology  Vascular surgery  IR    Review of Systems:    Unable to perform due to mental acuity            Allergies to Medications  Patient has no known allergies.      Past Medical History  Active Ambulatory Problems     Diagnosis Date Noted   • No Active Ambulatory Problems     Resolved Ambulatory Problems     Diagnosis Date Noted   • No Resolved Ambulatory Problems     No Additional Past Medical History       Family History:  History reviewed. No pertinent family history.      Social History:  Social History     Socioeconomic History   • Marital status: Not on file     Spouse name: Not on file   • Number of children: Not on file   • Years of education: Not on file   • Highest education level: Not on file   Occupational History   • Not on file   Tobacco Use   • Smoking status: Unknown If Ever Smoked   • Smokeless tobacco: Not on file   Substance and Sexual Activity   • Alcohol use: Not on file   • Drug use: Not on file   • Sexual activity: Not on file   Other Topics Concern   • Not on file   Social History Narrative   • Not on file     Social Determinants of Health     Financial Resource Strain: Not on file   Food Insecurity: Not on file   Transportation Needs: Not on file   Physical Activity: Not on file   Stress: Not on file   Social Connections: Not on file   Intimate Partner Violence: Not on file   Housing Stability: Not on file        Outpatient Medications:  No current facility-administered medications on file prior to  "encounter.     No current outpatient medications on file prior to encounter.         Physical Exam:   /87   Pulse (!) 55   Temp 36.1 °C (97 °F) (Temporal)   Resp 18   Ht 1.88 m (6' 2\")   Wt 93.2 kg (205 lb 7.5 oz)   SpO2 98%     Physical Exam  Constitutional:       Appearance: He is ill-appearing.      Interventions: He is sedated and intubated.      Comments: obtunded   HENT:      Head: Normocephalic and atraumatic.      Right Ear: External ear normal.      Left Ear: External ear normal.      Nose: Nose normal.      Mouth/Throat:      Mouth: Mucous membranes are moist.      Pharynx: No pharyngeal swelling, oropharyngeal exudate or posterior oropharyngeal erythema.   Eyes:      Pupils: Pupils are equal, round, and reactive to light.   Cardiovascular:      Rate and Rhythm: Normal rate and regular rhythm.      Pulses:           Dorsalis pedis pulses are 0 on the right side and 1+ on the left side.      Heart sounds: Normal heart sounds.   Pulmonary:      Effort: He is intubated.      Breath sounds: Normal breath sounds.   Abdominal:      General: Abdomen is flat.      Palpations: Abdomen is soft.   Musculoskeletal:      Cervical back: Neck supple.      Right lower leg: No edema.      Left lower leg: No edema.   Skin:     General: Skin is warm and dry.      Capillary Refill: Capillary refill takes more than 3 seconds.   Neurological:      Mental Status: He is unresponsive.      Comments: Corneal reflex present, gag and cough absent           Lab Data Review:    Recent Labs     03/04/22  2247 03/05/22  0600 03/05/22  0840   WBC 14.3* 14.2* 15.2*   RBC 3.61* 3.69* 3.69*   HEMOGLOBIN 9.9* 10.1* 10.2*   HEMATOCRIT 31.7* 32.2* 31.7*   MCV 87.8 87.3 85.9   MCH 27.4 27.4 27.6   RDW 47.3 47.3 46.1   PLATELETCT 301 301 276   MPV 9.4 9.8 9.6   NEUTSPOLYS 82.50* 82.60* 84.80*   LYMPHOCYTES 8.70* 10.10* 7.10*   MONOCYTES 7.00 6.30 6.70   EOSINOPHILS 0.90 0.10 0.60   BASOPHILS 0.30 0.40 0.30         Recent Labs     " 03/05/22 0105 03/05/22  0840   APTT 30.5 65.8*   INR 1.35* 1.44*     Recent Labs     03/04/22 2247 03/05/22 0105 03/05/22  0600   SODIUM 137 135 134*   POTASSIUM 4.4 4.5 4.4   CHLORIDE 101 100 101   CO2 20 23 20   BUN 25* 26* 26*   CREATININE 1.15 1.11 1.01   CALCIUM 9.3 9.3 9.0   ALBUMIN 3.1* 3.0* 2.7*     Recent Labs     03/04/22 2247 03/05/22 0105 03/05/22  0600 03/05/22  0840   ASTSGOT 17 19 26  --    ALTSGPT 15 14 19  --    TBILIRUBIN 0.4 0.6 0.4  --    ALKPHOSPHAT 86 92 85  --    GLOBULIN 3.5 3.4 3.2  --    INR  --  1.35*  --  1.44*         Imaging/Procedures Review:    US-EXTREMITY ARTERY LOWER BILAT   Final Result      EC-ECHOCARDIOGRAM LTD W/ CONT         US-EXTREMITY ARTERY UPPER UNILAT LEFT   Final Result      US-EXTREMITY VENOUS UPPER UNILAT LEFT   Final Result      US-EXTREMITY VENOUS LOWER BILAT         MR-LUMBAR SPINE-WITH & W/O   Final Result      1.  Abnormal T2 fluid signal intensity in the L1, L2 and to a lesser extent the L5-S1 vertebral disc spaces. There is no definite evidence of adjacent marrow edema signal or enhancement in the opposing endplates. Early discitis at these levels cannot be    excluded. Short interval imaging follow-up is recommended as clinically appropriate.   2.  There is diffuse enhancement in the surgical bed extending to the level of the dorsal thecal sac. There is no definite evidence of epidural fluid collection or abscess. Evaluation of the postoperative levels is somewhat limited by hardware magnetic    susceptibility artifact.   3.  Postsurgical changes of the lumbar spine with laminectomy defects extending from L2 to S1 right posterior hemifusion hardware at L4-5 and lateral fusion hardware L4-5. A disc spacer device appears present at L4-5.   4.  Multilevel degenerative changes of the lumbar spine as described above.      MR-BRAIN-WITH & W/O   Final Result      1.  Moderate to large sized acute to subacute infarcts in the left anterior cerebral artery  territory, left parietal, right parietal, bilateral occipital lobes and right cerebellum. Patchy areas of enhancement are noted in the left anterior cerebral    artery territory and right cerebellum which is more consistent with subacute chronicity. There is no evidence of hemorrhagic transformation.   2.  Chronic right frontal lobe and bilateral cerebellar infarcts.   3.  Mild diffuse cerebral substance loss.   4.  Moderate microangiopathic ischemic change versus demyelination or gliosis.      CT-CTA NECK WITH & W/O-POST PROCESSING   Final Result      CT angiogram of the neck within normal limits.      CT-CTA HEAD WITH & W/O-POST PROCESS   Final Result      CT angiogram of the [place] within normal limits.      CT-CEREBRAL PERFUSION ANALYSIS   Final Result      1.  Cerebral blood flow less than 30% likely representing completed infarct = 0 mL.      2.  T Max more than 6 seconds likely representing combination of completed infarct and ischemia = 27 mL.      3.  Mismatched volume likely representing ischemic brain/penumbra = 27      4.  Please note that the cerebral perfusion was performed on the limited brain tissue around the basal ganglia region. Infarct/ischemia outside the CT perfusion sections can be missed in this study.      CT-ABDOMEN-PELVIS WITH   Final Result      1.  Several limited areas of decreased enhancement in the spleen are present consistent with splenic infarcts.      2.  Renal infarcts also appear to be present in the parenchyma of the left kidney.      3.  Tubular filling defect consistent with thrombus is identified in the aorta as described above.      4.  Some wall thickening appears to be present in the cecum which is not fully distended. This could be due to lack of distention or spasm although colitis is also possible.      5.  Consolidation and volume loss in the left lung base.      CT-HEAD W/O   Final Result         1.  Brain swelling and edema and right cerebellar hemisphere  is noted which could indicate subacute infarct.      2.  Evidence of previous right frontal lobe infarct with encephalomalacia.      3.  No intracranial hemorrhage.      Decreased attenuation in the cerebral white matter likely indicates microvascular ischemic disease.         CT-CTA CHEST PULMONARY ARTERY W/ RECONS   Final Result      1.  Exam is positive for significant pulmonary emboli bilaterally.      2.  Abnormally elevated RV LV ratio is present.      3.  Consolidation and volume loss present in much of the left lower lobe could be due to pneumonia. Pulmonary infarct is also possible.      These findings were discussed with BON FALCON on 03/05/2022. Abdominal CT results also discussed..      DX-CHEST-PORTABLE (1 VIEW)   Final Result         1.  Right central line noted with tip at the level of the SVC. No pneumothorax identified. Endotracheal tube is noted in place.      DX-CHEST-PORTABLE (1 VIEW)   Final Result         1.  Elevation of left hemidiaphragm and probable atelectasis in the left lung base. Developing pneumonitis or pneumonia is also possible.      2.  No other infiltrates or consolidations identified.          EKG:   per my independant read:  1st degree HB, rate 60, Normal Sinus Rhythm, no ST/T changes      Assessment and Plan:    Embolic CVA  Possibly septic in nature due to pt's history of MSSA infection  Additionally pt has a PFO that was found on SALVATORE in January, and pt has evidence of bilateral DVTs which could also be the source of the arterial thromboses.  Multiple area of acute, subacute, and chronic infarction found on head CT and MRI  No evidence of hemorrhagic conversion on MRI  However has a high risk of bleeding, especially if these emboli are septic  Repeat TTE with bubble study done this morning 3/5, awaiting results  May need SALVATORE depending on results  Pt is being systemically anticoagulated with heparin infusion without boluses   Neurology, IR and vascular surgery all in  "agreement with this being the best course of action  May need ID consult if vegetation found on ECHO    Cerebellar edema  Found on CT head  Neuro exam is limited due to pt being intubated and on sedation  Corneal reflex present, gag and cough absent  Pt started on 3% NS infusion at 40 ml/hr, rate changes per MD only  Na goal 145-150, check serum Na q 6 hrs  Titrate ventilator for a goal CO2 of 35-40  Neuro checks q 2 hours    encephalopathy  Present on admission  Likely multifactorial, pt has multiple embolisms including PE and cerebral, as well as concern for sepsis, as well as cerebellar edema  Neuro exam is limited due to pt being intubated and sedated    PE  Bilateral with elevated RV to LV ratio  Bilateral RLE and LLE DVTs present on lower extremity US   Per IR pt is not a candidate for EKOS at this time but could be considered if his hemodynamic status were to decline  Anticoagulation with heparin infusion without boluses  Awaiting results from morning ECHO    Aortic thrombus  Abdominal CT: \"Tubular filling defect centrally in the abdominal aorta which begins above the level of the renal arteries is noted. Mean diameter is 1.2 cm. Filling defect in in the mid to distal aorta and measures approximately 6 cm in length.\"  Per Dr. Bai thrombectomy is not indicated at this time.  Prefer systemic anticoagulation with heparin infusion.    Respiratory failure with hypoxia  Pt intubated in the ED for airway protection  Possible causes include: CVA, encephalopathy, sepsis, PE, and/or LLL infarction vs infiltration  Target CO2 of 35-40 to help mitigate cerebellar edema  Daily SAT, SBT  ABCDEF bundle  Ventilator order set  Precedex for sedation  Fentanyl for analgesia    Shock  Present on admission  Possibly septic vs neurogenic   SIRS criteria include: HR>90, tachypnea RR >20, WBC >12,000  If septic, source is likely MSSA becteremia  Pt received sepsis bolus of 30 ml/kg in the ED  Despite this remains hypotensive " "requiring levo infusion to keep MAP >65  IV antibiotics: cefepime and vancomycin  Blood cultures pending results    Splenic thrombus  Abdominal CT: \"limited wedge-shaped areas of decreased enhancement in the mid spleen and anterior spleen\"  Appreciate vascular surgery recomendations  systemic anticoagulation with heparin infusion.    Left renal thrombus  \"decreased enhancement within portions of the parenchyma of the left kidney\"  Appreciate vascular surgery recomendations  systemic anticoagulation with heparin infusion.  Monitor renal function    Bilateral lower extremity DVT  systemic anticoagulation with heparin infusion.    Arterial thrombus of the right leg  Diminished pedal pulses in right foot, cap refill > 3 seconds, foot is warm  systemic anticoagulation with heparin infusion.  Appreciate vascular surgery recomendations    PFO  Present on SALVATORE done at [facility] in January  May account for the presence of both arterial and venous thromboses  ECHO with bubble study results pending  May need SALVATORE depending on results of TTE    S/P lumbar fusion  [facility] Soliman in 11/19/2021 with complications as outlined above    Wound infection complicating hardware  MSSA infection requiring debridement in December of 2021 and extended antibiotics course including cefepime, keflex, and rifampin  Recurrent bacteremia in January, unclear if lumbar spine was evaluated at that point  MRI of lumbar spine here showed no abscess or epidural fluid collection  Pt has been started on vanco and cefepime    Quality Measures:  Feeding: NPO   Analgesia: fentanyl  Sedation: precedex  Thromboprophylaxis: heparin drip  Head of bed: 30 degrees   Ulcer prophylaxis: pepcid  Glycemic control: ISS  Bowel care: PRN   Indwelling lines: central line, ETT, PIVs, shaver, arterial line  Deescalation of antibiotics: awaiting blood culture results    Patient is critically ill at this time.  I have spent 30 minutes " examining this    patient, all lab data, x-ray, and discussion with RN, RT. Critical care time: 60 min. No time overlap. Procedures not included in time. Thank you for asking me to consult on the patient.  I appreciate the opportunity to assist in their care and will follow along closely with you.

## 2022-03-06 NOTE — PROGRESS NOTES
[facility] ICU Progress Note      Admit Date: 3/4/2022    Resident(s): Mary Lucas M.D.   Attending:  Dr. Richardson Workman    Patient ID:    Name:  Adan Espinosa     YOB: 1945  Age:  76 y.o.  male   MRN:  [id number]    Hospital Course (carried forward and updated):  Adan Espinosa is a 76 y.o. male with known history of lumbabr fusion followed with a complicated MSSA associated infection requiring a washout in Dec 2021 with bacteremia with CVA without endocarditis in Jan 2022, HTN, HLD, that was admitted on  03/04/2022 for altered mental status and found via head brain MRI, pan CT and extremity ultrasound to have multiple level of thrombi likely secondary to septic emboli.    03/05: Awaiting for echo. Troponin increasing. Was started on heparin after weighing risks and benefirs. Discussed with family and code status change to DNR with intubation. Vascular and IR recommended against any surgical intervention.    03/06: clinically not worsening and no evidence of change in neuro status or worsening embolism. More responsive to command this morning.CT ordered for the AM as well as SALVATORE. Mild improvement tin leukocytosis. No evidence of abscess or fluid collection on MRI lumbar    Consultants:  Critical Care  IR  Vascular surgery    Vitals Range last 24h:  Pulse:  [54-68] 65  Resp:  [18-89] 18  BP: (106-139)/(70-89) 121/75  SpO2:  [95 %-99 %] 97 %      Intake/Output Summary (Last 24 hours) at 3/6/2022 1338  Last data filed at 3/6/2022 0800  Gross per 24 hour   Intake 2297.69 ml   Output 1295 ml   Net 1002.69 ml        Review of Systems   Unable to perform ROS: Intubated        PHYSICAL EXAM:  Vitals:    03/06/22 1115 03/06/22 1130 03/06/22 1145 03/06/22 1200   BP:    121/75   Pulse: 65 67 63 65   Resp: 18 18 18 18   Temp:       TempSrc:    Bladder   SpO2: 98% 98% 97% 97%   Weight:       Height:        Body mass index is 28.34 kg/m².    O2 therapy: Pulse Oximetry: 97 %, O2 Delivery Device:  Ventilator    Date 03/06/22 0700 - 03/07/22 0659   Shift 6851-4282 0038-7538 5595-7951 24 Hour Total   INTAKE   Shift Total       OUTPUT   Urine 120   120     Output (mL) (Urethral Catheter Temperature probe) 120   120   Shift Total 120   120   NET -120   -120        Physical Exam  Constitutional:       General: He is in acute distress.      Appearance: He is ill-appearing and diaphoretic.   HENT:      Head: Normocephalic.      Nose: Nose normal.      Mouth/Throat:      Mouth: Mucous membranes are moist.   Eyes:      Pupils: Pupils are equal, round, and reactive to light.   Neck:      Comments: Unable to assess  Cardiovascular:      Rate and Rhythm: Normal rate and regular rhythm.      Pulses: Normal pulses.      Heart sounds: No murmur heard.  Pulmonary:      Effort: Pulmonary effort is normal.      Breath sounds: Normal breath sounds. No wheezing or rales.   Abdominal:      General: There is no distension.      Palpations: Abdomen is soft.      Tenderness: There is no abdominal tenderness.   Musculoskeletal:      Right lower leg: No edema.      Left lower leg: No edema.   Skin:     General: Skin is warm.   Neurological:      Mental Status: He is alert.      Comments: Unable to assess   Psychiatric:      Comments: Unable to assess       Recent Labs     03/05/22  0001 03/05/22  0902 03/06/22  0427   ISTATAPH 7.332*  --  7.422   ISTATAPCO2 47.9*  --  32.4   ISTATAPO2 172*  --  89*   ISTATATCO2 27  --  22   NUDKDTY3SHK 99  --  97   ISTATARTHCO3 25.4*  --  21.1   ISTATARTBE -1  --  -3   ISTATTEMP 97.5 F 97.2 F 98.6 F   ISTATFIO2 100 40 40   ISTATSPEC Arterial Venous Arterial   ISTATAPHTC 7.341*  --  7.422   ZDYEELCB2TK 168*  --  89*     Recent Labs     03/05/22  0105 03/05/22  0600 03/05/22  1314 03/05/22  1530 03/05/22  2115 03/06/22  0315   SODIUM 135 134*   < > 139 138 139   POTASSIUM 4.5 4.4  --   --   --  4.3   CHLORIDE 100 101  --   --   --  110   CO2 23 20  --   --   --  19*   BUN 26* 26*  --   --   --  24*    CREATININE 1.11 1.01  --   --   --  0.97   MAGNESIUM  --   --   --   --   --  2.2   PHOSPHORUS  --   --   --   --   --  3.3   CALCIUM 9.3 9.0  --   --   --  8.7    < > = values in this interval not displayed.     Recent Labs     03/04/22 2247 03/05/22 0105 03/05/22 0600 03/06/22 0315   ALTSGPT 15 14 19  --    ASTSGOT 17 19 26  --    ALKPHOSPHAT 86 92 85  --    TBILIRUBIN 0.4 0.6 0.4  --    GLUCOSE 220* 217* 181* 142*     Recent Labs     03/05/22 0105 03/05/22 0600 03/05/22 0840 03/06/22 0315 03/06/22  1100   RBC  --  3.69* 3.69* 3.50*  --    HEMOGLOBIN  --  10.1* 10.2* 9.5*  --    HEMATOCRIT  --  32.2* 31.7* 31.0*  --    PLATELETCT  --  301 276 229  --    PROTHROMBTM 16.2*  --  17.1*  --  17.5*   APTT 30.5  --  65.8*  --  145.9*   INR 1.35*  --  1.44*  --  1.48*     Recent Labs     03/04/22 2247 03/05/22 0105 03/05/22 0600 03/05/22 0840 03/06/22 0315   WBC 14.3*  --  14.2* 15.2* 11.1*   NEUTSPOLYS 82.50*  --  82.60* 84.80* 80.90*   LYMPHOCYTES 8.70*  --  10.10* 7.10* 8.90*   MONOCYTES 7.00  --  6.30 6.70 6.70   EOSINOPHILS 0.90  --  0.10 0.60 2.50   BASOPHILS 0.30  --  0.40 0.30 0.50   ASTSGOT 17 19 26  --   --    ALTSGPT 15 14 19  --   --    ALKPHOSPHAT 86 92 85  --   --    TBILIRUBIN 0.4 0.6 0.4  --   --        Meds:  • MD Alert...Vancomycin per Pharmacy       • cefepime  2 g Stopped (03/06/22 0513)   • vancomycin  750 mg Stopped (03/06/22 0258)   • Respiratory Therapy Consult       • famotidine  20 mg      Or   • famotidine  20 mg     • senna-docusate  2 Tablet      And   • polyethylene glycol/lytes  1 Packet      And   • magnesium hydroxide  30 mL      And   • bisacodyl  10 mg     • MD Alert...Adult ICU Electrolyte Replacement per Pharmacy       • lidocaine  2 mL     • fentaNYL  50 mcg      And   • fentaNYL  100 mcg      And   • fentaNYL   200 mcg/hr (03/06/22 0000)    And   • dexmedetomidine (Precedex) infusion  0-0.7 mcg/kg/hr 0.3 mcg/kg/hr (03/06/22 0824)   • insulin regular  1-6 Units      And    • dextrose bolus  25 g     • heparin  0-30 Units/kg/hr 18 Units/kg/hr (03/06/22 0100)   • hydrALAZINE  10 mg     • 3% sodium chloride   60 mL/hr at 03/06/22 1056   • norepinephrine (Levophed) infusion  0-30 mcg/min 4 mcg/min (03/06/22 1053)        Procedures:  None     Imaging:  DX-CHEST-PORTABLE (1 VIEW)   Final Result      No significant change      US-EXTREMITY ARTERY LOWER BILAT   Final Result      EC-ECHOCARDIOGRAM LTD W/ CONT   Final Result      US-EXTREMITY ARTERY UPPER UNILAT LEFT   Final Result      US-EXTREMITY VENOUS UPPER UNILAT LEFT   Final Result      US-EXTREMITY VENOUS LOWER BILAT   Final Result      MR-LUMBAR SPINE-WITH & W/O   Final Result      1.  Abnormal T2 fluid signal intensity in the L1, L2 and to a lesser extent the L5-S1 vertebral disc spaces. There is no definite evidence of adjacent marrow edema signal or enhancement in the opposing endplates. Early discitis at these levels cannot be    excluded. Short interval imaging follow-up is recommended as clinically appropriate.   2.  There is diffuse enhancement in the surgical bed extending to the level of the dorsal thecal sac. There is no definite evidence of epidural fluid collection or abscess. Evaluation of the postoperative levels is somewhat limited by hardware magnetic    susceptibility artifact.   3.  Postsurgical changes of the lumbar spine with laminectomy defects extending from L2 to S1 right posterior hemifusion hardware at L4-5 and lateral fusion hardware L4-5. A disc spacer device appears present at L4-5.   4.  Multilevel degenerative changes of the lumbar spine as described above.      MR-BRAIN-WITH & W/O   Final Result      1.  Moderate to large sized acute to subacute infarcts in the left anterior cerebral artery territory, left parietal, right parietal, bilateral occipital lobes and right cerebellum. Patchy areas of enhancement are noted in the left anterior cerebral    artery territory and right cerebellum which is more  consistent with subacute chronicity. There is no evidence of hemorrhagic transformation.   2.  Chronic right frontal lobe and bilateral cerebellar infarcts.   3.  Mild diffuse cerebral substance loss.   4.  Moderate microangiopathic ischemic change versus demyelination or gliosis.      CT-CTA NECK WITH & W/O-POST PROCESSING   Final Result      CT angiogram of the neck within normal limits.      CT-CTA HEAD WITH & W/O-POST PROCESS   Final Result      CT angiogram of the [place] within normal limits.      CT-CEREBRAL PERFUSION ANALYSIS   Final Result      1.  Cerebral blood flow less than 30% likely representing completed infarct = 0 mL.      2.  T Max more than 6 seconds likely representing combination of completed infarct and ischemia = 27 mL.      3.  Mismatched volume likely representing ischemic brain/penumbra = 27      4.  Please note that the cerebral perfusion was performed on the limited brain tissue around the basal ganglia region. Infarct/ischemia outside the CT perfusion sections can be missed in this study.      CT-ABDOMEN-PELVIS WITH   Final Result      1.  Several limited areas of decreased enhancement in the spleen are present consistent with splenic infarcts.      2.  Renal infarcts also appear to be present in the parenchyma of the left kidney.      3.  Tubular filling defect consistent with thrombus is identified in the aorta as described above.      4.  Some wall thickening appears to be present in the cecum which is not fully distended. This could be due to lack of distention or spasm although colitis is also possible.      5.  Consolidation and volume loss in the left lung base.      CT-HEAD W/O   Final Result         1.  Brain swelling and edema and right cerebellar hemisphere is noted which could indicate subacute infarct.      2.  Evidence of previous right frontal lobe infarct with encephalomalacia.      3.  No intracranial hemorrhage.      Decreased attenuation in the cerebral white  matter likely indicates microvascular ischemic disease.         CT-CTA CHEST PULMONARY ARTERY W/ RECONS   Final Result      1.  Exam is positive for significant pulmonary emboli bilaterally.      2.  Abnormally elevated RV LV ratio is present.      3.  Consolidation and volume loss present in much of the left lower lobe could be due to pneumonia. Pulmonary infarct is also possible.      These findings were discussed with BON FALCON on 03/05/2022. Abdominal CT results also discussed..      DX-CHEST-PORTABLE (1 VIEW)   Final Result         1.  Right central line noted with tip at the level of the SVC. No pneumothorax identified. Endotracheal tube is noted in place.      DX-CHEST-PORTABLE (1 VIEW)   Final Result         1.  Elevation of left hemidiaphragm and probable atelectasis in the left lung base. Developing pneumonitis or pneumonia is also possible.      2.  No other infiltrates or consolidations identified.      EC-SALVATORE W/O CONT    (Results Pending)       ASSESSEMENT and PLAN:    * Septic shock (HCC)  Assessment & Plan  This is Septic shock Present on admission  SIRS criteria identified on my evaluation include: Tachycardia, with heart rate greater than 90 BPM, Tachypnea, with respirations greater than 20 per minute and Leukocytosis, with WBC greater than 12,000  Source is likely staph bacteremia with septic emboli  Presentation includes: Severe sepsis present, persistent hypotension after 30 ml/kg completed, and initial lactate level result is >= 4 mmol/L.   Patient was transferred to ICU for pressor control. [facility] and he underwent treatment with adherence to his meds  Current, the covering is cefepime and vancomycin and awaiting for blood cultures      Cerebrovascular accident (CVA) due to embolism (HCC)  Assessment & Plan  Patient was admitted with AMS and obtunded  CT brain, MRI, and CTA of neck consistent with multiple sub infarcts suspected to be secondary  to septic embolism  It is unknown if patient had a previously found patent foramen ovale or if a valvular vegetation has occurred in the past to result in a multi-level of embolism  Another cause could be emboli from thrombi found into the aorta  COVID antibodies have been negative  Patient was started on heparin after discussion of risks and benefits with family and the possible complication of hemorrhagic transformation.   The window is very small in regard of option given the bilateral PE and thrombi in the spleen and kidneys  Close follow up with Q2H neuro check  Awaiting for echo     Pulmonary embolism (HCC)  Assessment & Plan  Patient was found to have bilateral embolism in the lungs.  This has been thought to be secondary to septic embolism  The issue has been with the concomitant thrombi found in the brain, and the likelihood of hemorrhagic transformation  Risks and benefits were assessed and discussed with family  Vascular surgery and IR were consulted and agreed that despite patient being hemodynamically unstable, there is a high risk of bleeding should we go the tPA route or thromboembolectomy   We elected to start with heparin and to watch for hemorrhage transformation in the brain that is a high possible complication.    Acute respiratory failure with hypoxia (HCC)  Assessment & Plan  Patient admitted with a requirement of 6L to maintain saturation  He was found to have a PE and consolidation in the left lung  CT CTA was done showing bilateral PE and Consolidation and volume loss present in much of the left lower lobe   He is currently on heparin and on cefepime and vancomycin    Septic embolism (HCC)  Assessment & Plan  Patient with clots noted in both venous (both PE, and stroke), also with arterial thrombus in aorta and renal and splenic infarcts  With history of continued MSSA bacteremia post surgery with hardware infection very likely patient still has staph bacteremia which has led to septic  emboli and infarcts  On appropriate antibiotics  No evidence of infection r fluid collection or abscess on MRI lumbar  TTE without evidence of patent foramen ovale or endocarditis  Anticoagulation recommended for aortic thrombus and PE, however with multiple septic emboli strokes of different ages high risk for hemorrhagic conversion  Awaiting for SALVATORE    Renal infarct (HCC)  Assessment & Plan  Likely due to septic emboli possibly from aortic thrombus  Monitor renal function    Aortic thrombus (HCC)  Assessment & Plan  Aortic thrombus 1.2 cm diameter-6 cm length and abdominal aorta above the level of renal arteries  With patient's history of septic emboli and infection, likely this is a septic thrombus  Complicated by renal and splenic infarcts  Discussed with Dr Bai the [facility] surgeon, and he agrees that current treatment with heparin despite the risks is probably the best course of action    Encephalopathy  Assessment & Plan  This likely to be secondary to septic shock and with the moderate size subacute infarct found on imaging. There are just too many areas on hyperdensity that would correspond to brain injury that could jeopardize any future functional activity  This is ultimately associated with septic embolism secondary to infection that is also a main cause of AMS  Other conditions such as TSH, vitamin def are unlikely but will rule them out as an AMS workup.  Although the prognosis appears grim, we will only be able to fully assess functionality once patient is more stable and possibly extubated      Wound infection complicating hardware, sequela  Assessment & Plan  Patient had lumbar fusion in November 2021, this was complicated in December by a MSSA paraspinal infection likely including hardware , requiring wound washout and long course of antibiotics including rifampin at home  Patient had recurrent bacteremia in January-however could not find evidence that lumbar spine was evaluated at that time  again.  Per wife patient has pain intermittently at site, but in the last few days has had increasing pain and some swelling around the site  MRI of lumbar spine ordered  We will discuss with spine surgery when results are available  On Vanco and cefepime    S/P lumbar fusion  Assessment & Plan  Lumbar fusion for lumbar stenosis with neurologic claudication in November 2021 with multiple complications as above and below      Splenic infarct  Assessment & Plan  Multiple embolic infarcts noted, likely secondary to aortic thrombus/septic emboli      DISPO: Prognosis is grim with  These multiple embolism. Started on heparin with risk on hemorrhagic transformation that will have to be watched closely    CODE STATUS: DNR, I ok    Quality Measures:  Feeding: NPO  Analgesia: None  Sedation: fentanyl and precedex  Thromboprophylaxis: heparin  Head of bed: >30 degrees  Ulcer prophylaxis: Famotidine  Glycemic control: Correctional: Not needed  Bowel care: bowel regimen PRn  Indwelling lines: Arterial line and   Deescalation of antibiotics: Currently on cefepime and vancomycin    To the next resident:  -patient appears to have had a septic embolism, but the initial TTE does not  show either vegetation or patent foramen ovale. A SALVATORE has been ordered, and if not done, please contact cardiology  -Although it was risky to start him on heparin with the risk of hemorrhagic transformation, he has not had any symptom so far showing that worst case scenario. We'll still need to be cautious and have a low threshold for a CT scan. One has been ordered for the AM.  -There is no evidence of further neurologic deterioration, and the vent setting shows improvement of Co2 and possibly a sign of clots breaking off. Endaterectomy or tPA would not be beneficial.   -Neurosurgery and neuro will continue to follow.    Mary Lucas M.D.

## 2022-03-06 NOTE — CONSULTS
Reason for PC Consult: Advance Care Planning    Consulted by: Dr. Amador    Assessment:  General: Adan Espinosa is a 76 year-old male patient with a PMHx of HTN, HLD, and lumbar fusion in November 2021 with subsequent infection requiring washout in December. He had MSSA bacteremia and ischemic strokes thought to be due to septic emboli in 1/2022.    He presented to the ED 3/4/2022 with ALOC and hypoxia. Mental status declined further and he was intubated in the ED. CT revealed significant pulmonary emboli bilaterally. MRI of the brain also confirmed several focal infarcts, larges in the left CAROL ANN territory per neurology. Splenic and renal infarcts also noted as well as aortic thrombus.    Social: Adan lives at home with his wife of 14 years, Poonam. Prior to his surgery in November was completely independent. He enjoys fly fishing. He has traveled all over the world fly fishing. He and his wife also enjoy camping, gardening, and boating.    Consults: critical care, neurology, general surgery    Dyspnea: Yes- intubated  Last BM: 03/04/22  Pain: Unable to determine  Depression: Unable to determine  Dementia: Unable to Determine    Spiritual:  Is Scientologist or spirituality important for coping with this illness? Unable to determine  Has a  or spiritual provider visit been requested? Unable to determine    Palliative Performance Scale: 10%    Advance Directive: None  DPOA: No  POLST: No    Code Status: DNR/I OK    Outcome:  1140: Discussed case with MD, updates appreciated.    1415: PC RN met with patient's wife, Poonam, at bedside. Introduced self and role of palliative care. Poonam updated RN on the social and medical history. Assessed Poonam's understanding of current medical status, overall health picture, and options for future care. Demonstrates a good understanding of the current clinical picture.    PC RN queried if pt has ever discussed healthcare wishes and values or completed an Advance Directive in the  "past. Poonam states that patient has not completed an AD to her knowledge. [facility].    Explored patient’s expressed values, beliefs, and preferences in order to identify GOC. Poonam states that they have discussed health care goals and wishes to some extent and notes \"if he can't go fishing he doesn't want to be here.\" PC RN discussed the difficulty predicting outcome and recovery and that the team will continue to monitor progression over time. Poonam verbalized understanding. Did briefly introduce the concept of comfort focused care should pt's condition decline or should Poonam feel at any point that Adan would not want want to proceed with further invasive measures.    Spiritual care offered, Poonam declined. Active listening, reflection, reminiscing, validation & normalization, and empathic support utilized throughout this encounter.  All questions answered and contact information provided.     Recommendations: I do not recommend an ethics or hospice consult at this time because GOC discussion is still ongoing.    Updated: MD and ICU RN    Plan: Will continue to monitor clinical picture and support pt/family    Thank you for allowing Palliative Care to participate in this patient's care. Please feel free to call [phone] with any questions or concerns.  "

## 2022-03-06 NOTE — CARE PLAN
Vent Day 3    CMV 20, vt 500cc, peep 8, Fio2 40%      Problem: Ventilation  Goal: Ability to achieve and maintain unassisted ventilation or tolerate decreased levels of ventilator support  Description: Target End Date:  4 days     Document on Vent flowsheet    1.  Support and monitor invasive and noninvasive mechanical ventilation  2.  Monitor ventilator weaning response  3.  Perform ventilator associated pneumonia prevention interventions  4.  Manage ventilation therapy by monitoring diagnostic test results  Outcome: Not Progressing

## 2022-03-06 NOTE — PROGRESS NOTES
"Vascular surgery    Patient remains intubated  Unconscious but arousable  Patient has some motor function left lower and upper extremity  Right sided hemiparesis    No clinical evidence of further thromboembolism.  Creatinine stable,  Abdomen soft  Left upper extremity edema  Bilateral lower extremities warm    /74   Pulse 64   Temp 36.1 °C (97 °F) (Temporal)   Resp 20   Ht 1.88 m (6' 2\")   Wt 94.8 kg (208 lb 15.9 oz)   SpO2 96%     Intake/Output Summary (Last 24 hours) at 3/6/2022 0824  Last data filed at 3/6/2022 0800  Gross per 24 hour   Intake 2547.01 ml   Output 1420 ml   Net 1127.01 ml     Recent Labs     03/05/22  0600 03/05/22  0840 03/06/22 0315   WBC 14.2* 15.2* 11.1*   RBC 3.69* 3.69* 3.50*   HEMOGLOBIN 10.1* 10.2* 9.5*   HEMATOCRIT 32.2* 31.7* 31.0*   MCV 87.3 85.9 88.6   MCH 27.4 27.6 27.1   MCHC 31.4* 32.2* 30.6*   RDW 47.3 46.1 48.3   PLATELETCT 301 276 229   MPV 9.8 9.6 9.6     Recent Labs     03/05/22 0105 03/05/22  0600 03/05/22 1314 03/05/22 1530 03/05/22 2115 03/06/22 0315   SODIUM 135 134*   < > 139 138 139   POTASSIUM 4.5 4.4  --   --   --  4.3   CHLORIDE 100 101  --   --   --  110   CO2 23 20  --   --   --  19*   GLUCOSE 217* 181*  --   --   --  142*   BUN 26* 26*  --   --   --  24*   CREATININE 1.11 1.01  --   --   --  0.97   CALCIUM 9.3 9.0  --   --   --  8.7    < > = values in this interval not displayed.     Recent Labs     03/05/22 0105 03/05/22  0600 03/05/22  1314 03/05/22  1530 03/05/22 2115 03/06/22 0315   SODIUM 135 134*   < > 139 138 139   POTASSIUM 4.5 4.4  --   --   --  4.3   CHLORIDE 100 101  --   --   --  110   CO2 23 20  --   --   --  19*   GLUCOSE 217* 181*  --   --   --  142*   BUN 26* 26*  --   --   --  24*    < > = values in this interval not displayed.     Recent Labs     03/05/22  0105 03/05/22  0840   APTT 30.5 65.8*   INR 1.35* 1.44*           Current Facility-Administered Medications   Medication Dose   • MD Alert...Vancomycin per Pharmacy     • " cefepime (Maxipime) 2 g in dextrose 5% 20 mL IV syringe  2 g   • vancomycin (VANCOCIN) 750 mg in  mL IVPB  750 mg   • Respiratory Therapy Consult     • famotidine (PEPCID) tablet 20 mg  20 mg    Or   • famotidine (PEPCID) injection 20 mg  20 mg   • senna-docusate (PERICOLACE or SENOKOT S) 8.6-50 MG per tablet 2 Tablet  2 Tablet    And   • polyethylene glycol/lytes (MIRALAX) PACKET 1 Packet  1 Packet    And   • magnesium hydroxide (MILK OF MAGNESIA) suspension 30 mL  30 mL    And   • bisacodyl (DULCOLAX) suppository 10 mg  10 mg   • MD Alert...ICU Electrolyte Replacement per Pharmacy     • lidocaine (XYLOCAINE) 1 % injection 2 mL  2 mL   • fentaNYL (SUBLIMAZE) injection 50 mcg  50 mcg    And   • fentaNYL (SUBLIMAZE) injection 100 mcg  100 mcg    And   • fentaNYL (SUBLIMAZE) 50 mcg/mL in 50mL (Continuous Infusion)      And   • dexmedetomidine (Precedex) 400 mcg/100mL D5W premix infusion  0-0.7 mcg/kg/hr   • insulin regular (HumuLIN R,NovoLIN R) injection  1-6 Units    And   • dextrose 50% (D50W) injection 25 g  25 g   • heparin infusion 25,000 units in 500 mL 0.45% NACL  0-30 Units/kg/hr   • hydrALAZINE (APRESOLINE) injection 10 mg  10 mg   • 3% sodium chloride (HYPERTONIC SALINE) 500mL infusion     • norepinephrine (Levophed) 8 mg in 250 mL NS infusion (premix)  0-30 mcg/min       A:  Active Hospital Problems    Diagnosis    • Septic shock (HCC) [A41.9, R65.21]    • Pulmonary embolism (HCC) [I26.99]    • Aortic thrombus (HCC) [I74.10]    • Splenic infarct [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  Bubble study pending  Vascular surgery will continue to follow closely    Vinicius Bai MD

## 2022-03-06 NOTE — CARE PLAN
Problem: Ventilation  Goal: Ability to achieve and maintain unassisted ventilation or tolerate decreased levels of ventilator support  Description: Target End Date:  4 days     Document on Vent flowsheet    1.  Support and monitor invasive and noninvasive mechanical ventilation  2.  Monitor ventilator weaning response  3.  Perform ventilator associated pneumonia prevention interventions  4.  Manage ventilation therapy by monitoring diagnostic test results  Outcome: Not Progressing       Ventilator Daily Summary    Vent Day #20    Ventilator settings: APVCMV / Vt: 500 / PEEP: 8 / FiO2: 40% / f20    Weaning trials: Patient was ineligible for Spont, per protocol.     Plan: Continue current ventilator settings and wean mechanical ventilation as tolerated per physician orders.

## 2022-03-06 NOTE — PROGRESS NOTES
Chief Complaint   Patient presents with   • ALOC     Pt BIBA from home after being altered per wife for the last hour. Pt found to be 86% on RA and was placed on 6 L n/c. [facility] and had post-op complication and placed on antibiotics which he's been compliant with. Pt responsive to painful stimuli on arrival. GCS 9. . Pt BP 90/57 on arrival.      Neurology Progress Note    History of present illness:  This is a 76-year old male with PMHx significant for Lumbar fusion in November, complicated by MSSA surgical site incision of the lumbar spine requiring washout in December 2021 and MSSA bacteremia with ischemic stroke secondary to septic emboli (without endocarditis seen on SALVATORE at that time ) in January 2022, also with  hypertension and hyperlipidemia who again presented to [facility] on 3/5/22 for a chief complaint of altered mental status and hypoxia. The patient had been [facility]; had been doing reasonably well. Over the past 48 hours, the patient had poor PO intake, was poorly interactive, thus EMS called. On arrival here, BG WNL; patient was noted to be mildly hypotensive (90/57); neurotrophilia with WBC 14.3 concerning for sepsis; was admitted to ICU for further care.     Overnight, patient reportedly became more somnolent/less responsive. Stroke Alert called. Stat CT head revealed question of hypodensities scattered about the Left hemisphere (left CAROL ANN/MCA territories) concerning for evolving acute ischemic stroke. MRI Brain wo contrast was obtained and confirmed these findings; no hemorrhagic conversion; Patient was also found to have large/extensive PE; CTA also demonstrated thrombus within the perivisceral aorta with associated splenic and renal artery infarcts. Patient has thus been started on Heparin gtt.     Currently, patient is laying in bed; he is intubated/sedated. Does not follow commands, no  spontaneous or purposeful motor activity. Further ROS is limited.     Neurology has been consulted by Dr. Richardson Workman to further evaluate the findings noted above.     Interval, 3/6/22:  Patient is laying in bed; remains intubated, sedated with Precedex, Fentanyl. Moves LUE/LLE spontaneously; intermittently to command. No movement to LUE/LLE. No events overnight per nursing; remains on Heparin gtt.     No changes to HPI as was previously documented.      Past medical history:   History reviewed. No pertinent past medical history.    Past surgical history:   History reviewed. No pertinent surgical history.    Family history:   History reviewed. No pertinent family history.    Social history:   Social History     Socioeconomic History   • Marital status: Not on file     Spouse name: Not on file   • Number of children: Not on file   • Years of education: Not on file   • Highest education level: Not on file   Occupational History   • Not on file   Tobacco Use   • Smoking status: Unknown If Ever Smoked   • Smokeless tobacco: Not on file   Substance and Sexual Activity   • Alcohol use: Not on file   • Drug use: Not on file   • Sexual activity: Not on file   Other Topics Concern   • Not on file   Social History Narrative   • Not on file     Social Determinants of Health     Financial Resource Strain: Not on file   Food Insecurity: Not on file   Transportation Needs: Not on file   Physical Activity: Not on file   Stress: Not on file   Social Connections: Not on file   Intimate Partner Violence: Not on file   Housing Stability: Not on file       Current medications:   Current Facility-Administered Medications   Medication Dose   • MD Alert...Vancomycin per Pharmacy     • cefepime (Maxipime) 2 g in dextrose 5% 20 mL IV syringe  2 g   • vancomycin (VANCOCIN) 750 mg in  mL IVPB  750 mg   • Respiratory Therapy Consult     • famotidine (PEPCID) tablet 20 mg  20 mg    Or   • famotidine (PEPCID) injection 20 mg  20 mg   •  senna-docusate (PERICOLACE or SENOKOT S) 8.6-50 MG per tablet 2 Tablet  2 Tablet    And   • polyethylene glycol/lytes (MIRALAX) PACKET 1 Packet  1 Packet    And   • magnesium hydroxide (MILK OF MAGNESIA) suspension 30 mL  30 mL    And   • bisacodyl (DULCOLAX) suppository 10 mg  10 mg   • MD Alert...ICU Electrolyte Replacement per Pharmacy     • lidocaine (XYLOCAINE) 1 % injection 2 mL  2 mL   • fentaNYL (SUBLIMAZE) injection 50 mcg  50 mcg    And   • fentaNYL (SUBLIMAZE) injection 100 mcg  100 mcg    And   • fentaNYL (SUBLIMAZE) 50 mcg/mL in 50mL (Continuous Infusion)      And   • dexmedetomidine (Precedex) 400 mcg/100mL D5W premix infusion  0-0.7 mcg/kg/hr   • insulin regular (HumuLIN R,NovoLIN R) injection  1-6 Units    And   • dextrose 50% (D50W) injection 25 g  25 g   • heparin infusion 25,000 units in 500 mL 0.45% NACL  0-30 Units/kg/hr   • hydrALAZINE (APRESOLINE) injection 10 mg  10 mg   • 3% sodium chloride (HYPERTONIC SALINE) 500mL infusion     • norepinephrine (Levophed) 8 mg in 250 mL NS infusion (premix)  0-30 mcg/min       Medication Allergy:  No Known Allergies    Review of systems:   Limited as patient is intubated/sedated.       Physical examination:   Vitals:    03/06/22 0615 03/06/22 0630 03/06/22 0752 03/06/22 0800   BP:       Pulse: 67 67 64 64   Resp: 20 20 (!) 24 20   Temp:       TempSrc:    Bladder   SpO2: 95% 95% 96%    Weight:       Height:         General: Patient in no acute distress, pleasant and cooperative.  HEENT: Normocephalic, no signs of acute trauma.   Neck: supple, no meningeal signs or carotid bruits. There is normal range of motion. No tenderness on exam.   Chest: clear to auscultation. No cough.   CV: RRR, no murmurs.   Skin: no signs of acute rashes or trauma.   Musculoskeletal: joints exhibit full range of motion, without any pain to palpation. There are no signs of joint or muscle swelling. There is no tenderness to deep palpation of muscles.   Psychiatric: No hallucinatory  behavior. Denies symptoms of depression or suicidal ideation. Mood and affect appear normal on exam.     NEUROLOGICAL EXAM:   Mental status, orientation: Intubated, comatose.   Speech and language: No verbal output; does not follow commands.   Cranial nerve exam: Pupils are 2 mm bilaterally and equally reactive to light. Eyes midline. Does not blink to visual threat. Does not track spontaneously nor to command. Cough/gag/corneal reflexes appear to be intact. Occulocephalics intact. Face appears symmetric.  Tongue is midline and without any signs of tongue biting or fasciculations.  Motor exam: No spontaneous or purposeful motor activity on the Right. Moves LUE/LLE spontaneously, at least antigravity. No abnormal movements were seen on exam.   Sensory exam Does not withdrawal to deep nox stim on the Right; withdrawal to nox stim on the Left.   Deep tendon reflexes: Plantar responses are mute. There is no clonus.   Coordination: Deferred, patient does not participate.   Gait: Not assessed at this time as patient is currently unable.      NIH Stroke Scale    1a Level of Consciousness 2  1b Orientation Questions 2  1c Response to Commands 2  2 Gaze   3 Visual Fields   4 Facial Movement   5 Motor Function (arm)   a Left 4  b Right 4  6 Motor Function (leg)   a Left 1  b Right 1  7 Limb Ataxia   8 Sensory 1  9 Language 3  10 Articulation 1  11 Extinction/Inattention     Score: 21      ANCILLARY DATA REVIEWED:     Lab Data Review:  Recent Results (from the past 24 hour(s))   POCT glucose device results    Collection Time: 03/05/22 12:58 PM   Result Value Ref Range    POC Glucose, Blood 140 (H) 65 - 99 mg/dL   TROPONIN    Collection Time: 03/05/22  1:14 PM   Result Value Ref Range    Troponin T 646 (H) 6 - 19 ng/L   SODIUM SERUM (NA)    Collection Time: 03/05/22  1:14 PM   Result Value Ref Range    Sodium 137 135 - 145 mmol/L   Heparin Xa (Unfractionated)    Collection Time: 03/05/22  3:30 PM   Result Value Ref Range     Heparin Xa (UFH) 0.28 IU/mL   TROPONIN    Collection Time: 03/05/22  3:30 PM   Result Value Ref Range    Troponin T 549 (H) 6 - 19 ng/L   SODIUM SERUM (NA)    Collection Time: 03/05/22  3:30 PM   Result Value Ref Range    Sodium 139 135 - 145 mmol/L   POCT glucose device results    Collection Time: 03/05/22  5:12 PM   Result Value Ref Range    POC Glucose, Blood 136 (H) 65 - 99 mg/dL   Heparin Anti-Xa    Collection Time: 03/05/22  9:15 PM   Result Value Ref Range    Heparin Xa (UFH) 0.24 IU/mL   SODIUM SERUM (NA)    Collection Time: 03/05/22  9:15 PM   Result Value Ref Range    Sodium 138 135 - 145 mmol/L   TROPONIN    Collection Time: 03/05/22  9:15 PM   Result Value Ref Range    Troponin T 430 (H) 6 - 19 ng/L   POCT glucose device results    Collection Time: 03/06/22 12:09 AM   Result Value Ref Range    POC Glucose, Blood 125 (H) 65 - 99 mg/dL   CBC with Differential    Collection Time: 03/06/22  3:15 AM   Result Value Ref Range    WBC 11.1 (H) 4.8 - 10.8 K/uL    RBC 3.50 (L) 4.70 - 6.10 M/uL    Hemoglobin 9.5 (L) 14.0 - 18.0 g/dL    Hematocrit 31.0 (L) 42.0 - 52.0 %    MCV 88.6 81.4 - 97.8 fL    MCH 27.1 27.0 - 33.0 pg    MCHC 30.6 (L) 33.7 - 35.3 g/dL    RDW 48.3 35.9 - 50.0 fL    Platelet Count 229 164 - 446 K/uL    MPV 9.6 9.0 - 12.9 fL    Neutrophils-Polys 80.90 (H) 44.00 - 72.00 %    Lymphocytes 8.90 (L) 22.00 - 41.00 %    Monocytes 6.70 0.00 - 13.40 %    Eosinophils 2.50 0.00 - 6.90 %    Basophils 0.50 0.00 - 1.80 %    Immature Granulocytes 0.50 0.00 - 0.90 %    Nucleated RBC 0.00 /100 WBC    Neutrophils (Absolute) 8.99 (H) 1.82 - 7.42 K/uL    Lymphs (Absolute) 0.99 (L) 1.00 - 4.80 K/uL    Monos (Absolute) 0.74 0.00 - 0.85 K/uL    Eos (Absolute) 0.28 0.00 - 0.51 K/uL    Baso (Absolute) 0.06 0.00 - 0.12 K/uL    Immature Granulocytes (abs) 0.05 0.00 - 0.11 K/uL    NRBC (Absolute) 0.00 K/uL   Basic Metabolic Panel (BMP)    Collection Time: 03/06/22  3:15 AM   Result Value Ref Range    Sodium 139 135 - 145  mmol/L    Potassium 4.3 3.6 - 5.5 mmol/L    Chloride 110 96 - 112 mmol/L    Co2 19 (L) 20 - 33 mmol/L    Glucose 142 (H) 65 - 99 mg/dL    Bun 24 (H) 8 - 22 mg/dL    Creatinine 0.97 0.50 - 1.40 mg/dL    Calcium 8.7 8.5 - 10.5 mg/dL    Anion Gap 10.0 7.0 - 16.0   Magnesium    Collection Time: 03/06/22  3:15 AM   Result Value Ref Range    Magnesium 2.2 1.5 - 2.5 mg/dL   Phosphorus    Collection Time: 03/06/22  3:15 AM   Result Value Ref Range    Phosphorus 3.3 2.5 - 4.5 mg/dL   TROPONIN    Collection Time: 03/06/22  3:15 AM   Result Value Ref Range    Troponin T 313 (H) 6 - 19 ng/L   VITAMIN B12    Collection Time: 03/06/22  3:15 AM   Result Value Ref Range    Vitamin B12 -True Cobalamin 387 211 - 911 pg/mL   TSH WITH REFLEX TO FT4    Collection Time: 03/06/22  3:15 AM   Result Value Ref Range    TSH 2.540 0.380 - 5.330 uIU/mL   Heparin Xa (Unfractionated)    Collection Time: 03/06/22  3:15 AM   Result Value Ref Range    Heparin Xa (UFH) 0.36 IU/mL   ESTIMATED GFR    Collection Time: 03/06/22  3:15 AM   Result Value Ref Range    GFR If African American >60 >60 mL/min/1.73 m 2    GFR If Non African American >60 >60 mL/min/1.73 m 2   POCT arterial blood gas device results    Collection Time: 03/06/22  4:27 AM   Result Value Ref Range    Ph 7.422 7.400 - 7.500    Pco2 32.4 26.0 - 37.0 mmHg    Po2 89 (H) 64 - 87 mmHg    Tco2 22 20 - 33 mmol/L    S02 97 93 - 99 %    Hco3 21.1 17.0 - 25.0 mmol/L    BE -3 -4 - 3 mmol/L    Body Temp 98.6 F degrees    O2 Therapy 40 %    iPF Ratio 223     Ph Temp Milad 7.422 7.400 - 7.500    Pco2 Temp Co 32.4 26.0 - 37.0 mmHg    Po2 Temp Cor 89 (H) 64 - 87 mmHg    Specimen Arterial     DelSys Vent     End Tidal Carbon Dioxide 29 mmhg    Tidal Volume 500 mL    Peep End Expiratory Pressure 8 cmh20    Set Rate 20     Mode APV-CMV        Labs reviewed by me.         Imaging reviewed by me:     DX-CHEST-PORTABLE (1 VIEW)   Final Result      No significant change      US-EXTREMITY ARTERY LOWER BILAT    Final Result      EC-ECHOCARDIOGRAM LTD W/ CONT   Final Result      US-EXTREMITY ARTERY UPPER UNILAT LEFT   Final Result      US-EXTREMITY VENOUS UPPER UNILAT LEFT   Final Result      US-EXTREMITY VENOUS LOWER BILAT   Final Result      MR-LUMBAR SPINE-WITH & W/O   Final Result      1.  Abnormal T2 fluid signal intensity in the L1, L2 and to a lesser extent the L5-S1 vertebral disc spaces. There is no definite evidence of adjacent marrow edema signal or enhancement in the opposing endplates. Early discitis at these levels cannot be    excluded. Short interval imaging follow-up is recommended as clinically appropriate.   2.  There is diffuse enhancement in the surgical bed extending to the level of the dorsal thecal sac. There is no definite evidence of epidural fluid collection or abscess. Evaluation of the postoperative levels is somewhat limited by hardware magnetic    susceptibility artifact.   3.  Postsurgical changes of the lumbar spine with laminectomy defects extending from L2 to S1 right posterior hemifusion hardware at L4-5 and lateral fusion hardware L4-5. A disc spacer device appears present at L4-5.   4.  Multilevel degenerative changes of the lumbar spine as described above.      MR-BRAIN-WITH & W/O   Final Result      1.  Moderate to large sized acute to subacute infarcts in the left anterior cerebral artery territory, left parietal, right parietal, bilateral occipital lobes and right cerebellum. Patchy areas of enhancement are noted in the left anterior cerebral    artery territory and right cerebellum which is more consistent with subacute chronicity. There is no evidence of hemorrhagic transformation.   2.  Chronic right frontal lobe and bilateral cerebellar infarcts.   3.  Mild diffuse cerebral substance loss.   4.  Moderate microangiopathic ischemic change versus demyelination or gliosis.      CT-CTA NECK WITH & W/O-POST PROCESSING   Final Result      CT angiogram of the neck within normal  limits.      CT-CTA HEAD WITH & W/O-POST PROCESS   Final Result      CT angiogram of the [place] within normal limits.      CT-CEREBRAL PERFUSION ANALYSIS   Final Result      1.  Cerebral blood flow less than 30% likely representing completed infarct = 0 mL.      2.  T Max more than 6 seconds likely representing combination of completed infarct and ischemia = 27 mL.      3.  Mismatched volume likely representing ischemic brain/penumbra = 27      4.  Please note that the cerebral perfusion was performed on the limited brain tissue around the basal ganglia region. Infarct/ischemia outside the CT perfusion sections can be missed in this study.      CT-ABDOMEN-PELVIS WITH   Final Result      1.  Several limited areas of decreased enhancement in the spleen are present consistent with splenic infarcts.      2.  Renal infarcts also appear to be present in the parenchyma of the left kidney.      3.  Tubular filling defect consistent with thrombus is identified in the aorta as described above.      4.  Some wall thickening appears to be present in the cecum which is not fully distended. This could be due to lack of distention or spasm although colitis is also possible.      5.  Consolidation and volume loss in the left lung base.      CT-HEAD W/O   Final Result         1.  Brain swelling and edema and right cerebellar hemisphere is noted which could indicate subacute infarct.      2.  Evidence of previous right frontal lobe infarct with encephalomalacia.      3.  No intracranial hemorrhage.      Decreased attenuation in the cerebral white matter likely indicates microvascular ischemic disease.         CT-CTA CHEST PULMONARY ARTERY W/ RECONS   Final Result      1.  Exam is positive for significant pulmonary emboli bilaterally.      2.  Abnormally elevated RV LV ratio is present.      3.  Consolidation and volume loss present in much of the left lower lobe could be due to pneumonia. Pulmonary infarct is also possible.       These findings were discussed with BON FALCON on 03/05/2022. Abdominal CT results also discussed..      DX-CHEST-PORTABLE (1 VIEW)   Final Result         1.  Right central line noted with tip at the level of the SVC. No pneumothorax identified. Endotracheal tube is noted in place.      DX-CHEST-PORTABLE (1 VIEW)   Final Result         1.  Elevation of left hemidiaphragm and probable atelectasis in the left lung base. Developing pneumonitis or pneumonia is also possible.      2.  No other infiltrates or consolidations identified.          Presumed mechanism by TOAST:  __Large Artery Atherosclerosis  __Small Vessel (Lacunar)  _X_Cardioembolic  __Other (Sickle Cell, Vasculitis, Hypercoagulable)  __Unknown    Modified Cuming Scale (MRS): 3 = Moderate disability; requires some help, but able to walk without assistance      ASSESSMENT AND PLAN:  76-year old male with PMHx significant for Lumbar fusion in November, complicated by MSSA surgical site incision of the lumbar spine requiring washout in December 2021 and MSSA bacteremia with ischemic stroke secondary to septic emboli (without endocarditis seen on SALVATORE at that time ) in January 2022, also with  hypertension and hyperlipidemia who again presented to [facility] on 3/5/22 for a chief complaint of altered mental status and hypoxia; here found to be hypotensive, question of recurrent sepsis (leukocytosis with WBC 14.3 with neutrophilia). Overnight patient was found to be obtunded, stroke alert called. Imaging has ultimately revealed acute ischemic strokes involving Left frontal, Left parietal region (Left CAROL ANN/MCA territories) as well as right parietal, bilateral occipital lobes and right cerebellum concerning for cardioembolic phenomena. Patient was also found to have large/extensive PE; CTA also demonstrated thrombus within the perivisceral aorta with associated splenic and renal artery infarcts. Patient thus started on Heparin gtt for systemic  anticoagulation.     Patient's exam is somewhat improved today; he follows commands intermittently on the Left; no movement on the Right. Opens eyes to name; otherwise is minimally/non interactive at time of my exam. As was previously noted, Given extensive disease burden (ie numerous thromboembolic events involving several organ systems), his prognosis is guarded; in considering extent/nature of his acute ischemic stroke--likely survivable, however chance of meaningful neurological and functional recovery is questionable at this point given his already protracted recovery course. Recommend palliative care consultation and further goals of care discussion with patient's family.     Recommendations/Plan:     -q2h and PRN neuro assessment. VS per nursing/unit protocol. BP goal is normotension (note CTA head/neck with no LVO nor hemodynamically significant stenosis); 100-130/60-80.  Antihypertensives per primary team.   -Telemetry; currently SR. Screen for Afib/arrhythmia. Note TTE with EF 55%; no PFO, aortic valve sclerosis without stenosis; no other gross structural abnormalities.    -Continue Heparin gtt. Management per ICU team, vascular.   -Given nature of stroke as well as LDL 37, OK to forgo statin tx at this time.   -Recommend aggressive BG management per primary team. Avoid IVF with Dextrose. BG goal 140-180. hemoglobin A1c is 5.9.   -PT/OT/SLP eval and treat, if/when appropriate.   -All other medical management per ICU team. Blood cultures pending. Hypertonic gtt started per ICU team for possibility of evolving cerebral edema; Na goal 145-155.   -DVT PPX: SCDs.      The plan of care above has been discussed with Dr. Antonio. Please call with questions.     SIDRA BermanRCHAPARRITA.  [place] [employer]

## 2022-03-07 NOTE — CARE PLAN
Vent Day 3    CMV 18, vt 500 cc, peep 8, Fio2 35%      Problem: Ventilation  Goal: Ability to achieve and maintain unassisted ventilation or tolerate decreased levels of ventilator support  Description: Target End Date:  4 days     Document on Vent flowsheet    1.  Support and monitor invasive and noninvasive mechanical ventilation  2.  Monitor ventilator weaning response  3.  Perform ventilator associated pneumonia prevention interventions  4.  Manage ventilation therapy by monitoring diagnostic test results  Outcome: Progressing

## 2022-03-07 NOTE — CARE PLAN
Problem: Ventilation  Goal: Ability to achieve and maintain unassisted ventilation or tolerate decreased levels of ventilator support  Description: Target End Date:  4 days     Document on Vent flowsheet    1.  Support and monitor invasive and noninvasive mechanical ventilation  2.  Monitor ventilator weaning response  3.  Perform ventilator associated pneumonia prevention interventions  4.  Manage ventilation therapy by monitoring diagnostic test results  Outcome: Not Progressing      Ventilator Daily Summary    Vent Day #3    Ventilator settings: APVCMV / F18  Vt 500 / PEEP 8 / FiO2 @ 40%     Weaning trials: Not ordered for 3/6/22    Plan: Continue current ventilator settings and wean mechanical ventilation as tolerated per physician orders.

## 2022-03-07 NOTE — THERAPY
Physical Therapy Contact Note    PT consult received and acknowledged. Patient currently intubated and sedated and not appropriate for PT. Will re attempt as able and appropriate.    Mary Edwards, PT, DPT  [phone]

## 2022-03-07 NOTE — THERAPY
Missed Therapy     Patient Name: Adan Espinosa  Age:  76 y.o., Sex:  male  Medical Record #: [id number]  Today's Date: 3/7/2022    OT consult rec'd. Pt currently sedated/intubated; will hold and will re-attempt as appropriate/able once pt able to meaningfully participate.

## 2022-03-07 NOTE — PROGRESS NOTES
[facility] ICU Progress Note      Admit Date: 3/4/2022    Resident(s): Gabriella Denson M.D.   Attending:  Dr. Richardson Workman    Patient ID:    Name:  Adan Espinosa     YOB: 1945  Age:  76 y.o.  male   MRN:  [id number]    Hospital Course (carried forward and updated):  Adan Espinosa is a 76 y.o. male with known history of lumbabr fusion followed with a complicated MSSA associated infection requiring a washout in Dec 2021 with bacteremia with CVA without endocarditis in Jan 2022, HTN, HLD, that was admitted on  03/04/2022 for altered mental status and found via head brain MRI, pan CT and extremity ultrasound to have multiple level of thrombi likely secondary to septic emboli.    03/05: Awaiting for echo. Troponin increasing. Was started on heparin after weighing risks and benefirs. Discussed with family and code status change to DNR with intubation. Vascular and IR recommended against any surgical intervention.    03/06: clinically not worsening and no evidence of change in neuro status or worsening embolism. More responsive to command this morning.CT ordered for the AM as well as SALVATORE. Mild improvement tin leukocytosis. No evidence of abscess or fluid collection on MRI lumbar    03/07: SALVATORE ordered and Dr. Knutson from cardiology notified, SAT/SBT trials 3% nacl switched to hypertonic sodium acetate (maintain acid-base balance, so as not to become acidotic) ; goals of care discussion when able; may order atropine if sustains bradycardia /systolic <110    Consultants:  Critical Care  IR  Vascular surgery    Vitals Range last 24h:  Pulse:  [60-75] 71  Resp:  [15-20] 15  BP: (118-136)/(71-80) 121/76  SpO2:  [97 %-99 %] 98 %      Intake/Output Summary (Last 24 hours) at 3/7/2022 1220  Last data filed at 3/7/2022 0600  Gross per 24 hour   Intake 2983.5 ml   Output 1500 ml   Net 1483.5 ml        Review of Systems   Unable to perform ROS: Intubated        PHYSICAL EXAM:  Vitals:    03/07/22 0600 03/07/22 0701 03/07/22  0900 03/07/22 1130   BP:       Pulse: 71 71  71   Resp: 18 18  15   Temp:       TempSrc:       SpO2: 98% 98%  98%   Weight:   94.8 kg (208 lb 15.9 oz)    Height:        Body mass index is 28.34 kg/m².    O2 therapy: Pulse Oximetry: 98 %, O2 Delivery Device: Ventilator         Physical Exam  Constitutional:       General: He is in acute distress.      Appearance: He is ill-appearing and diaphoretic.   HENT:      Head: Normocephalic.      Nose: Nose normal.      Mouth/Throat:      Mouth: Mucous membranes are moist.   Eyes:      Pupils: Pupils are equal, round, and reactive to light.   Neck:      Comments: Unable to assess  Cardiovascular:      Rate and Rhythm: Normal rate and regular rhythm.      Pulses: Normal pulses.      Heart sounds: No murmur heard.  Pulmonary:      Effort: Pulmonary effort is normal.      Breath sounds: Normal breath sounds. No wheezing or rales.   Abdominal:      General: There is no distension.      Palpations: Abdomen is soft.      Tenderness: There is no abdominal tenderness.   Musculoskeletal:      Right lower leg: No edema.      Left lower leg: No edema.   Skin:     General: Skin is warm.   Neurological:      Mental Status: He is alert.      Comments: Bilateral LE withdraws to painful stimuli, UE left withdraws to painful stimuli , limited ability to assess motor and sensory given intubation   Psychiatric:      Comments: Unable to assess       Recent Labs     03/05/22  0001 03/05/22  0902 03/06/22  0427 03/07/22  0321   ISTATAPH 7.332*  --  7.422 7.366*   ISTATAPCO2 47.9*  --  32.4 33.3   ISTATAPO2 172*  --  89* 120*   ISTATATCO2 27  --  22 20   [id number] 99  --  97 99   ISTATARTHCO3 25.4*  --  21.1 19.0   ISTATARTBE -1  --  -3 -6*   ISTATTEMP 97.5 F 97.2 F 98.6 F 98.8 F   ISTATFIO2 100 40 40 40   ISTATSPEC Arterial Venous Arterial Arterial   ISTATAPHTC 7.341*  --  7.422 7.364*   [id number] 168*  --  89* 121*     Recent Labs     03/05/22  0600 03/05/22  1314 03/06/22  0314  03/06/22 1100 03/06/22  1815 03/06/22  2300 03/07/22  0510   SODIUM 134*   < > 139   < > 147* 147* 148*   POTASSIUM 4.4  --  4.3  --   --   --  4.1   CHLORIDE 101  --  110  --   --   --  120*   CO2 20  --  19*  --   --   --  17*   BUN 26*  --  24*  --   --   --  21   CREATININE 1.01  --  0.97  --   --   --  0.96   MAGNESIUM  --   --  2.2  --   --   --  2.3   PHOSPHORUS  --   --  3.3  --   --   --  3.2   CALCIUM 9.0  --  8.7  --   --   --  8.8    < > = values in this interval not displayed.     Recent Labs     03/04/22 2247 03/05/22 0105 03/05/22 0600 03/06/22 0315 03/07/22  0510   ALTSGPT 15 14 19  --   --    ASTSGOT 17 19 26  --   --    ALKPHOSPHAT 86 92 85  --   --    TBILIRUBIN 0.4 0.6 0.4  --   --    GLUCOSE 220* 217* 181* 142* 141*     Recent Labs     03/05/22 0105 03/05/22 0600 03/05/22 0840 03/06/22 0315 03/06/22 1100 03/07/22  0510   RBC  --    < > 3.69* 3.50*  --  3.35*   HEMOGLOBIN  --    < > 10.2* 9.5*  --  9.2*   HEMATOCRIT  --    < > 31.7* 31.0*  --  30.0*   PLATELETCT  --    < > 276 229  --  214   PROTHROMBTM 16.2*  --  17.1*  --  17.5*  --    APTT 30.5  --  65.8*  --  145.9*  --    INR 1.35*  --  1.44*  --  1.48*  --     < > = values in this interval not displayed.     Recent Labs     03/04/22 2247 03/05/22 0105 03/05/22 0600 03/05/22 0840 03/06/22 0315 03/07/22  0510   WBC 14.3*  --  14.2* 15.2* 11.1* 10.7   NEUTSPOLYS 82.50*  --  82.60* 84.80* 80.90* 83.70*   LYMPHOCYTES 8.70*  --  10.10* 7.10* 8.90* 6.40*   MONOCYTES 7.00  --  6.30 6.70 6.70 7.10   EOSINOPHILS 0.90  --  0.10 0.60 2.50 2.00   BASOPHILS 0.30  --  0.40 0.30 0.50 0.30   ASTSGOT 17 19 26  --   --   --    ALTSGPT 15 14 19  --   --   --    ALKPHOSPHAT 86 92 85  --   --   --    TBILIRUBIN 0.4 0.6 0.4  --   --   --        Meds:  • Hypertonic Sodium Acetate (513 mEq Na Acetate per liter) infusion   60 mL/hr at 03/07/22 1201   • atropine  0.5 mg     • Pharmacy  1 Each     • MD Alert...Vancomycin per Pharmacy       • cefepime  2  g Stopped (03/07/22 0519)   • vancomycin  750 mg Stopped (03/07/22 0351)   • Respiratory Therapy Consult       • famotidine  20 mg      Or   • famotidine  20 mg     • senna-docusate  2 Tablet      And   • polyethylene glycol/lytes  1 Packet      And   • magnesium hydroxide  30 mL      And   • bisacodyl  10 mg     • MD Alert...Adult ICU Electrolyte Replacement per Pharmacy       • lidocaine  2 mL     • fentaNYL  50 mcg      And   • fentaNYL  100 mcg      And   • fentaNYL   200 mcg/hr (03/07/22 0719)    And   • dexmedetomidine (Precedex) infusion  0-0.7 mcg/kg/hr 0.3 mcg/kg/hr (03/07/22 1154)   • insulin regular  1-6 Units      And   • dextrose bolus  25 g     • heparin  0-30 Units/kg/hr 18 Units/kg/hr (03/07/22 0719)   • hydrALAZINE  10 mg     • norepinephrine (Levophed) infusion  0-30 mcg/min 4 mcg/min (03/06/22 1053)        Procedures:  None     Imaging:  CT-HEAD W/O   Final Result      1.  Moderately large acute areas of infarction involving the left frontal, parietal, and occipital lobes. Additionally there is a smaller wedge-shaped area of acute infarction involving the right hemicerebellum inferiorly in      2.  Chronic area of infarction involving the right superior frontal lobe.      3.  Age-related cerebral atrophy.         DX-CHEST-PORTABLE (1 VIEW)   Final Result      No significant change      US-EXTREMITY ARTERY LOWER BILAT   Final Result      EC-ECHOCARDIOGRAM LTD W/ CONT   Final Result      US-EXTREMITY ARTERY UPPER UNILAT LEFT   Final Result      US-EXTREMITY VENOUS UPPER UNILAT LEFT   Final Result      US-EXTREMITY VENOUS LOWER BILAT   Final Result      MR-LUMBAR SPINE-WITH & W/O   Final Result      1.  Abnormal T2 fluid signal intensity in the L1, L2 and to a lesser extent the L5-S1 vertebral disc spaces. There is no definite evidence of adjacent marrow edema signal or enhancement in the opposing endplates. Early discitis at these levels cannot be    excluded. Short interval imaging follow-up is  recommended as clinically appropriate.   2.  There is diffuse enhancement in the surgical bed extending to the level of the dorsal thecal sac. There is no definite evidence of epidural fluid collection or abscess. Evaluation of the postoperative levels is somewhat limited by hardware magnetic    susceptibility artifact.   3.  Postsurgical changes of the lumbar spine with laminectomy defects extending from L2 to S1 right posterior hemifusion hardware at L4-5 and lateral fusion hardware L4-5. A disc spacer device appears present at L4-5.   4.  Multilevel degenerative changes of the lumbar spine as described above.      MR-BRAIN-WITH & W/O   Final Result      1.  Moderate to large sized acute to subacute infarcts in the left anterior cerebral artery territory, left parietal, right parietal, bilateral occipital lobes and right cerebellum. Patchy areas of enhancement are noted in the left anterior cerebral    artery territory and right cerebellum which is more consistent with subacute chronicity. There is no evidence of hemorrhagic transformation.   2.  Chronic right frontal lobe and bilateral cerebellar infarcts.   3.  Mild diffuse cerebral substance loss.   4.  Moderate microangiopathic ischemic change versus demyelination or gliosis.      CT-CTA NECK WITH & W/O-POST PROCESSING   Final Result      CT angiogram of the neck within normal limits.      CT-CTA HEAD WITH & W/O-POST PROCESS   Final Result      CT angiogram of the [place] within normal limits.      CT-CEREBRAL PERFUSION ANALYSIS   Final Result      1.  Cerebral blood flow less than 30% likely representing completed infarct = 0 mL.      2.  T Max more than 6 seconds likely representing combination of completed infarct and ischemia = 27 mL.      3.  Mismatched volume likely representing ischemic brain/penumbra = 27      4.  Please note that the cerebral perfusion was performed on the limited brain tissue around the basal ganglia region. Infarct/ischemia  outside the CT perfusion sections can be missed in this study.      CT-ABDOMEN-PELVIS WITH   Final Result      1.  Several limited areas of decreased enhancement in the spleen are present consistent with splenic infarcts.      2.  Renal infarcts also appear to be present in the parenchyma of the left kidney.      3.  Tubular filling defect consistent with thrombus is identified in the aorta as described above.      4.  Some wall thickening appears to be present in the cecum which is not fully distended. This could be due to lack of distention or spasm although colitis is also possible.      5.  Consolidation and volume loss in the left lung base.      CT-HEAD W/O   Final Result         1.  Brain swelling and edema and right cerebellar hemisphere is noted which could indicate subacute infarct.      2.  Evidence of previous right frontal lobe infarct with encephalomalacia.      3.  No intracranial hemorrhage.      Decreased attenuation in the cerebral white matter likely indicates microvascular ischemic disease.         CT-CTA CHEST PULMONARY ARTERY W/ RECONS   Final Result      1.  Exam is positive for significant pulmonary emboli bilaterally.      2.  Abnormally elevated RV LV ratio is present.      3.  Consolidation and volume loss present in much of the left lower lobe could be due to pneumonia. Pulmonary infarct is also possible.      These findings were discussed with BON FALCON on 03/05/2022. Abdominal CT results also discussed..      DX-CHEST-PORTABLE (1 VIEW)   Final Result         1.  Right central line noted with tip at the level of the SVC. No pneumothorax identified. Endotracheal tube is noted in place.      DX-CHEST-PORTABLE (1 VIEW)   Final Result         1.  Elevation of left hemidiaphragm and probable atelectasis in the left lung base. Developing pneumonitis or pneumonia is also possible.      2.  No other infiltrates or consolidations identified.      EC-SALVATORE W/O CONT    (Results Pending)    DX-CHEST-PORTABLE (1 VIEW)    (Results Pending)       ASSESSEMENT and PLAN:    * Septic shock (HCC)  Assessment & Plan  This is Septic shock Present on admission  SIRS criteria identified on my evaluation include: Tachycardia, with heart rate greater than 90 BPM, Tachypnea, with respirations greater than 20 per minute and Leukocytosis, with WBC greater than 12,000  Source is likely staph bacteremia with septic emboli  Presentation includes: Severe sepsis present, persistent hypotension after 30 ml/kg completed, and initial lactate level result is >= 4 mmol/L.   Patient was transferred to ICU for pressor control. [facility] and he underwent [facility] with adherence to his meds  Current, the covering is cefepime and vancomycin and awaiting for blood cultures, thus far NGTD on all cultures       SALVATORE ordered and Dr. Knutson from cardiology notified        Encephalopathy  Assessment & Plan  This likely to be secondary to septic shock and with the moderate size subacute infarct found on imaging. There are just too many areas on hyperdensity that would correspond to brain injury that could jeopardize any future functional activity  This is ultimately associated with septic embolism secondary to infection that is also a main cause of AMS  Other conditions such as TSH, vitamin def are unlikely but will rule them out as an AMS workup.  Although the prognosis appears grim, we will only be able to fully assess functionality once patient is more stable and possibly extubated      S/P lumbar fusion  Assessment & Plan  Lumbar fusion for lumbar stenosis with neurologic claudication in November 2021 with multiple complications as above and below      Wound infection complicating hardware, sequela  Assessment & Plan  Patient had lumbar fusion in November 2021, this was complicated in December by a MSSA paraspinal infection likely including hardware , requiring wound washout and long course of  antibiotics including rifampin at home  Patient had recurrent bacteremia in January-however could not find evidence that lumbar spine was evaluated at that time again.  Per wife patient has pain intermittently at site, but in the last few days has had increasing pain and some swelling around the site  MRI of lumbar spine ordered  We will discuss with spine surgery when results are available  On Vanco and cefepime    Septic embolism (HCC)  Assessment & Plan  Patient with clots noted in both venous (both PE, and stroke), also with arterial thrombus in aorta and renal and splenic infarcts  With history of continued MSSA bacteremia post surgery with hardware infection very likely patient still has staph bacteremia which has led to septic emboli and infarcts  On appropriate antibiotics  No evidence of infection r fluid collection or abscess on MRI lumbar  TTE without evidence of patent foramen ovale or endocarditis  Anticoagulation recommended for aortic thrombus and PE, however with multiple septic emboli strokes of different ages high risk for hemorrhagic conversion  Awaiting SALVATORE    Cerebrovascular accident (CVA) due to embolism (HCC)  Assessment & Plan  Patient was admitted with AMS and obtunded  CT brain, MRI, and CTA of neck consistent with multiple sub infarcts suspected to be secondary to septic embolism  It is unknown if patient had a previously found patent foramen ovale or if a valvular vegetation has occurred in the past to result in a multi-level of embolism  Another cause could be emboli from thrombi found into the aorta  COVID antibodies have been negative  Patient was started on heparin after discussion of risks and benefits with family and the possible complication of hemorrhagic transformation.   The window is very small in regard of option given the bilateral PE and thrombi in the spleen and kidneys  Close follow up with Q2H neuro check      Renal infarct (HCC)  Assessment & Plan  Likely due to septic  emboli possibly from aortic thrombus  Monitor renal function    Splenic infarct  Assessment & Plan  Multiple embolic infarcts noted, likely secondary to aortic thrombus/septic emboli    Aortic thrombus (HCC)  Assessment & Plan  Aortic thrombus 1.2 cm diameter-6 cm length and abdominal aorta above the level of renal arteries  With patient's history of septic emboli and infection, likely this is a septic thrombus  Complicated by renal and splenic infarcts  Discussed with Dr Bai the [facility] surgeon, and he agrees that current treatment with heparin despite the risks is probably the best course of action    Pulmonary embolism (HCC)  Assessment & Plan  Patient was found to have bilateral embolism in the lungs.  This has been thought to be secondary to septic embolism  The issue has been with the concomitant thrombi found in the brain, and the likelihood of hemorrhagic transformation  Risks and benefits were assessed and discussed with family  Vascular surgery and IR were consulted and agreed that despite patient being hemodynamically unstable, there is a high risk of bleeding should we go the tPA route or thromboembolectomy   Was elected to start with heparin and to watch for hemorrhage transformation in the brain that is a high possible complication.    Acute respiratory failure with hypoxia (HCC)  Assessment & Plan  Patient admitted with a requirement of 6L to maintain saturation  He was found to have a PE and consolidation in the left lung  CT CTA was done showing bilateral PE and Consolidation and volume loss present in much of the left lower lobe   He is currently on heparin and on cefepime and vancomycin  Goals of care discussion when able      CODE STATUS: DNR, JOSE Denson M.D.

## 2022-03-07 NOTE — PROGRESS NOTES
Vascular surgery    Patient remains unconscious  Remains hemiplegic  Endotracheally intubated    No clinical evidence of further embolization from perivisceral aortic thrombus  Renal function normal  Abdomen soft  Lower extremities adequately perfused    Patient remains neurologically devastated from significant stroke  Family is weighing options with respect to withdrawal of care  Continue medical management for perivisceral aortic thrombus with systemic anticoagulation    Vinicius Bai MD

## 2022-03-07 NOTE — DISCHARGE PLANNING
Patient remains intubated, TCC will no longer follow at this time. Physiatry to consult for medical management. Please reach out when patient is appropriate for IPR, [phone].

## 2022-03-07 NOTE — DISCHARGE PLANNING
Anticipated Discharge Disposition: possible transition to comfort care on 3/9    Action: RN CM attended IDT rounds and reviewed patient chart.  Patient requires ICU level of care, intubated.  Palliative consulted.  Per RN notes, plan to transition to comfort care measures on Wed 3/9 once daughter has arrived to bedside.     Barriers to Discharge: goals of care, possible transition to comfort care measures     Plan: HCM to remain available to assist with needs

## 2022-03-07 NOTE — PROGRESS NOTES
*  Dr. Workman at bedside with family to discuss plan of care. Plan to wait for daughter to fly in on Wednesday, March 9th and move toward comfort care. No SAT/SBT necessary at this time.    *Clarified Medication order and nursing communication order for 3% solution running. 3% NS was changed to 3% acetate.   *Last NA level was 153; Dr. Workman wants to keep 3% acetate running @ 60ml/hr. Continue to recheck NA levels Q6

## 2022-03-07 NOTE — PALLIATIVE CARE
"Palliative Care follow-up:   0930: [facility] with [facility] from [facility]. This document names Rudolph Espinosa as DPOA and Claudia Espinosa as first alternative. The numbers listed for both of these individuals are out of service.    Pt initialed next to the statement \"if I should have an incurable or irreversible condition that will cause my death, or am in a state of permanent unconsciousness from which, to a reasonable degree of medical certainty there can be no recovery, it is my desire that my life not be artificially prolonged by administration of 'life-sustaining' procedures.\"    1115: PC RN met with patient's wife, Poonam, at bedside. Poonam provided PC RN with Claudia's phone number ([phone]), Kardex updated. Poonam is tearful and states that she was updated further by a physician this morning. Poonam states that she feels that patient would want to proceed with comfort focused care if he it were unlikely that he would return to his baseline and be able to fly fish. She states that she has discussed this with patient's daughter, Claudia, who is in agreement and plans to fly in on Wednesday.     1120: PC RN called patient's daughter, Claudia. Discussed AD with Claudia. Claudia states that her brother (Rudolph) has been homeless for 7 years and is unable to be reached. Claudia states that she is willing to participate as DPOA if this is what Poonam wants but acknowledges that this document was completed before pt and Poonam were  and \"she knows his wishes 1,000x better than me.\" Discussed that per next of kin statute if Claudia defers acting as DPOA, Poonam would be the NOK and medical decision maker. Claudia states \"out of respect for her and for him, I want her to be the decision maker if she wants to be.\"     Discussed goals of care with Claudia who states tearfully “knowing my dad, he would not want to live in any state where he couldn’t enjoy his life.” Claudia plans to fly in again on " Wednesday 3/9.    1140: Returned to bedside and discussed above conversation with Poonam. Poonam was informed that Claudia is willing to participate in medical decision making as much or as little as Poonam wants/needs her to. Poonam voices that she would like to remain the main point of contact/medical decision maker at this time.    1500: Discussed case with Dr. Workman who discussed GOC with patient's wife earlier today. Plan is for comfort care on 3/9 when pt's daughter, Claudia, arrives.    Plan: Comfort care on 3/9    Thank you for allowing Palliative Care to support this patient and family. Contact [phone] for additional assistance, change in patient status, or with any questions/concerns.

## 2022-03-07 NOTE — HOSPITAL COURSE
Mr. Espinosa is a 76-year-old male with [facility] followed by a complicated MSSA bacteremia in December 2021 and endocarditis in January 2022 with hypertension, and hyperlipidemia that was admitted on 03/04/2022 for altered mental status and found via imaging to have multiple medical of body thrombi on suspicious of septic emboli..    He has areas of thrombi in the brain/stroke, pulmonary embolism bilaterally, thrombus in the aorta, spleen, kidney, left lower extremities.  Initially, this was thought to be of poor prognosis.  He was started on heparin with high risk of hemorrhagic transformation given septic emboli.  So far after 24 hours, there has not been any signs of that hemorrhagic transformation.    Vascular surgery has been on board, and they recommended against any procedure at the moment.  Neurology has also been followed.  Both services have recommended the risk of going for heparin given the benefit in this case.    For now, patient is hemodynamically stable and on ventilation.  FiO2 has improved and with reduced carbon dioxide.  There is the impression that with heparin on board some of the clot burden is being reduced.  We will continue that treatment at the moment.    The TTE that was done did not show any foramina patent ovale.  He did not show any vegetation.  A SALVATORE has been ordered.  Please reach out to cardiology if it has not been done in the morning.    As a note, MRI of the lumbar spine where we think this infection originated has not shown any fluid collection or abscess or sign of infection.

## 2022-03-07 NOTE — DIETARY
"Nutrition Support Assessment:  Day 3 of admit.  Adan Espinosa is a 76 y.o. male with admitting DX of Acute hypercapnic respiratory failure      Current problem list:  1. Septic shock  2. Encephalopathy  3. S/P lumbar fusion  4. Wound infection  5. Septic embolism  6. Cerebrovascular accident (CVA)  7. Renal infarct  8. Splenic infarct  9. Aortic thrombus  10. Pulmonary embolism  11. Acute respiratory failure with hypoxia     Assessment:  Estimated Nutritional Needs based on:   Height: 188 cm (6' 2\")  Weight: 94.8 kg (208 lb 15.9 oz)(bed scale)  Body mass index is 28.34 kg/m²., BMI classification: overweight    Calculation/Equation:   PSU (EMV=10.1 L/min, Tmax=37.5 C): VHP=2611 kcals  MSJ: REE=1718 x 1.1=1.3=1625-0045 kcals    - 0584-2102 kcals  (21-24 kcals/kg)    - 114-142 gms protein  (1.2-1.5 gms/kg)     Evaluation:   1. Pt with tube feed consult because he is sedated, intubated and NPO.   2. Pt has OG tube. Family does not want pt to go through the discomfort of removal of OG and Cortrak placement. Pt may change over to comfort care/hospice on 3/9/22. Nurse checked tubing on OG to make sure it is compatible with the TF pumps. TF through OG approved until Wednesday (3/9)  3. GI: LBM on 3/4/22  4. Labs: 3/7/22: sodium=153 (H and trending up), glucose=141 (H). Glucose acc-ck: 113-127   5. Meds: SSI, Precedex, Levo @ 4 mcg/min  6. Impact Peptide at a goal rate of 60 mL per hour will provide 2160 kcals, 135 gms of protein and 1109 mL of free water. This should meet his estimated nutrition needs.      Malnutrition Risk: unable to determine     Recommendations/Plan:  1. Initiate Impact Peptide at 25 mL/hour and advance per protocol to goal rate of 60 mL/hour  2. Additional fluids per MD  3. Monitor weights    RD will follow.              "

## 2022-03-08 NOTE — THERAPY
Physical Therapy Contact Note    Per chart and RN patient not appropriate for PT; chart indicates patient likely transitioning to comfort care once daughter arrives 3/9. Will continue to monitor for appropriateness of PT intervention and re attempt as able.    Mary Edwards, PT, DPT  [phone]

## 2022-03-08 NOTE — CARE PLAN
Problem: Ventilation  Goal: Ability to achieve and maintain unassisted ventilation or tolerate decreased levels of ventilator support  Description: Target End Date:  4 days     Document on Vent flowsheet    1.  Support and monitor invasive and noninvasive mechanical ventilation  2.  Monitor ventilator weaning response  3.  Perform ventilator associated pneumonia prevention interventions  4.  Manage ventilation therapy by monitoring diagnostic test results  Outcome: Not Met   Ventilator Daily Summary     Vent Day # 5 ETT 8.0@26     Ventilator settings changed this shift: No 18/500/8/35%     Weaning trials: No     Respiratory Procedures: None     Plan: Continue current ventilator settings and wean mechanical ventilation as tolerated per physician orders.

## 2022-03-08 NOTE — CARE PLAN
Problem: Ventilation  Goal: Ability to achieve and maintain unassisted ventilation or tolerate decreased levels of ventilator support  Description: Target End Date:  4 days     Document on Vent flowsheet    1.  Support and monitor invasive and noninvasive mechanical ventilation  2.  Monitor ventilator weaning response  3.  Perform ventilator associated pneumonia prevention interventions  4.  Manage ventilation therapy by monitoring diagnostic test results  Outcome: Not Met   Ventilator Daily Summary    Vent Day # 4 ETT 8.0@26    Ventilator settings changed this shift: No 18/500/8/35%    Weaning trials: No    Respiratory Procedures: None    Plan: Continue current ventilator settings and wean mechanical ventilation as tolerated per physician orders.

## 2022-03-08 NOTE — PROGRESS NOTES
Spiritual Care Note    Patient Information     Patient's Name: Adan Espinosa   MRN: [id number]    YOB: 1945   Age and Gender: 76 y.o. male   Unit/Service Area: [facility]   Room (and Bed): [place] [address]   Ethnicity or Nationality: white   Primary Language: English   Latter-day/Spiritual Preference: Unable to Answer   Place of Residence: [place], [place]   Family/Friends/Others Present: 2 family members   Medical Diagnosis(-es)/Procedure(s): septic shock   encephalopathy  s/p lumbar fusion  wound infection complicating hardware, sequela  septic embolism   CVA due to embolism  renal infarct  splenic infarct  aortic thrombus   pulmonary embolism  acute respiratory failure with hypoxia    Code Status: DNAR, I OK    Date of Admission: 3/4/2022   Length of Stay: 4 days        Spiritual Care Provider   silviano Hermosillo  [phone]   [email or web address]    Spiritual Screen Results  Is your spiritual health or inner well-being important to you as you cope with your medical condition?: Unable to determine  Would you like to receive a visit from our Spiritual Care team or your own Mu-ism or spiritual leader?: Unable to determine    Request Information  Nature of the Visit: Initial,On shift  Crisis Visit: Critical care  Referral From/ Origin of Request: Epic nursing    Encounter Information  Visited With: Family,Refused care  Interacton/Conversation: Family politely declined a spiritual care request  Plan: Visit Upon Request    Notes:

## 2022-03-08 NOTE — CARE PLAN
The patient is Unstable - High likelihood or risk of patient condition declining or worsening    Shift Goals  Clinical Goals: Maintain hemodynamics, Q2 neuro checks.  Patient Goals: SHAMIR  Family Goals: SHAMIR    Progress made toward(s) clinical / shift goals:    Problem: Safety - Medical Restraint  Goal: Remains free of injury from restraints (Restraint for Interference with Medical Device)  Outcome: Progressing  Goal: Free from restraint(s) (Restraint for Interference with Medical Device)  Outcome: Progressing     Problem: Optimal Care of the Stroke Patient  Goal: Optimal emergency care for the stroke patient  Outcome: Progressing  Goal: Optimal acute care for the stroke patient  Outcome: Progressing     Problem: Knowledge Deficit - Stroke Education  Goal: Patient's knowledge of stroke and risk factors will improve  Outcome: Progressing     Problem: Neuro Status  Goal: Neuro status will remain stable or improve  Outcome: Progressing     Problem: Hemodynamic Monitoring  Goal: Patient's hemodynamics, fluid balance and neurologic status will be stable or improve  Outcome: Progressing     Problem: Respiratory - Stroke Patient  Goal: Patient will achieve/maintain optimum respiratory rate/effort  Outcome: Progressing     Problem: Urinary Elimination  Goal: Establish and maintain regular urinary output  Outcome: Progressing       Patient is not progressing towards the following goals:

## 2022-03-08 NOTE — CONSULTS
Physical Medicine and Rehabilitation Consultation              Date of initial consultation: 3/8/2022  Consulting provider: Arminda Amador MD  Reason for consultation: assess for acute inpatient rehab appropriateness  LOS: 4 Day(s)    Chief complaint:  left CARO LANN, BL parietal, bilateral occipital lobes and right cerebellum ischemic stroke     HPI: The patient is a 76 y.o. right hand dominant male with a past medical history of lumbar fusion November 2021 complicated by MSSA infection requiring washout December 2021, ischemic stroke secondary to septic emboli in January 2022, hypertension and hyperlipidemia;  who presented on 3/4/2022 10:34 PM with altered mental status and hypoxia.  Patient was evaluated by neurology, saw concern for left CAROL ANN/MCA territory ischemic stroke on CT head, which was confirmed on MR brain.  Patient also found to have large PE and aortic thrombus, and was started on heparin drip.  Initial NIH score 30.  Echocardiogram found no endocarditis or left-to-right shunt, patient is Covid negative. The patient currently intubated.  Wife and son at bedside.    ROS  Pertinent positives are mentioned in the HPI, all others reviewed and are negative.    Social Hx:  1 SH  2 DINA  With: Spouse    IMAGING:    MR brain 3/5/2022  1.  Moderate to large sized acute to subacute infarcts in the left anterior cerebral artery territory, left parietal, right parietal, bilateral occipital lobes and right cerebellum. Patchy areas of enhancement are noted in the left anterior cerebral artery territory and right cerebellum which is more consistent with subacute chronicity. There is no evidence of hemorrhagic transformation.  2.  Chronic right frontal lobe and bilateral cerebellar infarcts.  3.  Mild diffuse cerebral substance loss.  4.  Moderate microangiopathic ischemic change versus demyelination or gliosis.    MR L-spine 3/5/2022  1.  Abnormal T2 fluid signal intensity in the L1, L2 and to a lesser extent the  L5-S1 vertebral disc spaces. There is no definite evidence of adjacent marrow edema signal or enhancement in the opposing endplates. Early discitis at these levels cannot be   excluded. Short interval imaging follow-up is recommended as clinically appropriate.  2.  There is diffuse enhancement in the surgical bed extending to the level of the dorsal thecal sac. There is no definite evidence of epidural fluid collection or abscess. Evaluation of the postoperative levels is somewhat limited by hardware magnetic   susceptibility artifact.  3.  Postsurgical changes of the lumbar spine with laminectomy defects extending from L2 to S1 right posterior hemifusion hardware at L4-5 and lateral fusion hardware L4-5. A disc spacer device appears present at L4-5.  4.  Multilevel degenerative changes of the lumbar spine as described above.    PROCEDURES:  None    PMH:  History reviewed. No pertinent past medical history.    PSH:  History reviewed. No pertinent surgical history.    FHX:  Non-pertinent to today's issues    Medications:  Current Facility-Administered Medications   Medication Dose   • lactated ringers infusion     • riFAMPin (RIFADINE) capsule 300 mg  300 mg   • Pharmacy Consult: Enteral tube insertion - review meds/change route/product selection  1 Each   • cefepime (Maxipime) 2 g in dextrose 5% 20 mL IV syringe  2 g   • Respiratory Therapy Consult     • famotidine (PEPCID) tablet 20 mg  20 mg    Or   • famotidine (PEPCID) injection 20 mg  20 mg   • senna-docusate (PERICOLACE or SENOKOT S) 8.6-50 MG per tablet 2 Tablet  2 Tablet    And   • polyethylene glycol/lytes (MIRALAX) PACKET 1 Packet  1 Packet    And   • magnesium hydroxide (MILK OF MAGNESIA) suspension 30 mL  30 mL    And   • bisacodyl (DULCOLAX) suppository 10 mg  10 mg   • MD Alert...ICU Electrolyte Replacement per Pharmacy     • lidocaine (XYLOCAINE) 1 % injection 2 mL  2 mL   • fentaNYL (SUBLIMAZE) injection 50 mcg  50 mcg    And   • fentaNYL (SUBLIMAZE)  "injection 100 mcg  100 mcg    And   • fentaNYL (SUBLIMAZE) 50 mcg/mL in 50mL (Continuous Infusion)      And   • dexmedetomidine (Precedex) 400 mcg/100mL D5W premix infusion  0-0.7 mcg/kg/hr   • insulin regular (HumuLIN R,NovoLIN R) injection  1-6 Units    And   • dextrose 50% (D50W) injection 25 g  25 g   • norepinephrine (Levophed) 8 mg in 250 mL NS infusion (premix)  0-30 mcg/min       Allergies:  No Known Allergies      Physical Exam:  Vitals: /72   Pulse (!) 59   Temp 36.1 °C (97 °F) (Temporal)   Resp 18   Ht 1.88 m (6' 2\")   Wt 94.8 kg (208 lb 15.9 oz)   SpO2 96%   Gen: Intubated  Head: NC/AT  Eyes/ Nose/ Mouth: moist mucous membranes  Cardio: RRR, good distal perfusion, warm extremities  Pulm: normal respiratory effort, no cyanosis   Abd: Soft NTND, negative borborygmi   Ext: No peripheral edema. No calf tenderness. No clubbing.    Labs: Reviewed and significant for   Recent Labs     03/06/22  0315 03/06/22  1100 03/07/22  0510 03/08/22  0550   RBC 3.50*  --  3.35* 3.25*   HEMOGLOBIN 9.5*  --  9.2* 8.9*   HEMATOCRIT 31.0*  --  30.0* 28.9*   PLATELETCT 229  --  214 209   PROTHROMBTM  --  17.5*  --   --    APTT  --  145.9*  --   --    INR  --  1.48*  --   --      Recent Labs     03/06/22  0315 03/06/22  1100 03/07/22  0510 03/07/22  1105 03/07/22  1816 03/08/22  0016 03/08/22  0550   SODIUM 139   < > 148*   < > 155* 152* 151*   POTASSIUM 4.3  --  4.1  --   --   --  3.5*   CHLORIDE 110  --  120*  --   --   --  118*   CO2 19*  --  17*  --   --   --  24   GLUCOSE 142*  --  141*  --   --   --  138*   BUN 24*  --  21  --   --   --  19   CREATININE 0.97  --  0.96  --   --   --  0.98   CALCIUM 8.7  --  8.8  --   --   --  8.8    < > = values in this interval not displayed.     Recent Results (from the past 24 hour(s))   CRP Quantitive (Non-Cardiac)    Collection Time: 03/07/22  6:16 PM   Result Value Ref Range    Stat C-Reactive Protein 16.09 (H) 0.00 - 0.75 mg/dL   Prealbumin    Collection Time: 03/07/22  " 6:16 PM   Result Value Ref Range    Pre-Albumin 6.3 (L) 18.0 - 38.0 mg/dL   SODIUM SERUM (NA)    Collection Time: 03/07/22  6:16 PM   Result Value Ref Range    Sodium 155 (H) 135 - 145 mmol/L   POCT glucose device results    Collection Time: 03/07/22  6:17 PM   Result Value Ref Range    POC Glucose, Blood 109 (H) 65 - 99 mg/dL   SODIUM SERUM (NA)    Collection Time: 03/08/22 12:16 AM   Result Value Ref Range    Sodium 152 (H) 135 - 145 mmol/L   POCT glucose device results    Collection Time: 03/08/22 12:18 AM   Result Value Ref Range    POC Glucose, Blood 111 (H) 65 - 99 mg/dL   CBC with Differential    Collection Time: 03/08/22  5:50 AM   Result Value Ref Range    WBC 8.9 4.8 - 10.8 K/uL    RBC 3.25 (L) 4.70 - 6.10 M/uL    Hemoglobin 8.9 (L) 14.0 - 18.0 g/dL    Hematocrit 28.9 (L) 42.0 - 52.0 %    MCV 88.9 81.4 - 97.8 fL    MCH 27.4 27.0 - 33.0 pg    MCHC 30.8 (L) 33.7 - 35.3 g/dL    RDW 48.6 35.9 - 50.0 fL    Platelet Count 209 164 - 446 K/uL    MPV 9.6 9.0 - 12.9 fL    Neutrophils-Polys 81.50 (H) 44.00 - 72.00 %    Lymphocytes 8.80 (L) 22.00 - 41.00 %    Monocytes 7.20 0.00 - 13.40 %    Eosinophils 1.60 0.00 - 6.90 %    Basophils 0.20 0.00 - 1.80 %    Immature Granulocytes 0.70 0.00 - 0.90 %    Nucleated RBC 0.00 /100 WBC    Neutrophils (Absolute) 7.22 1.82 - 7.42 K/uL    Lymphs (Absolute) 0.78 (L) 1.00 - 4.80 K/uL    Monos (Absolute) 0.64 0.00 - 0.85 K/uL    Eos (Absolute) 0.14 0.00 - 0.51 K/uL    Baso (Absolute) 0.02 0.00 - 0.12 K/uL    Immature Granulocytes (abs) 0.06 0.00 - 0.11 K/uL    NRBC (Absolute) 0.00 K/uL   Basic Metabolic Panel (BMP)    Collection Time: 03/08/22  5:50 AM   Result Value Ref Range    Sodium 151 (H) 135 - 145 mmol/L    Potassium 3.5 (L) 3.6 - 5.5 mmol/L    Chloride 118 (H) 96 - 112 mmol/L    Co2 24 20 - 33 mmol/L    Glucose 138 (H) 65 - 99 mg/dL    Bun 19 8 - 22 mg/dL    Creatinine 0.98 0.50 - 1.40 mg/dL    Calcium 8.8 8.5 - 10.5 mg/dL    Anion Gap 9.0 7.0 - 16.0   Magnesium     Collection Time: 03/08/22  5:50 AM   Result Value Ref Range    Magnesium 2.1 1.5 - 2.5 mg/dL   Phosphorus    Collection Time: 03/08/22  5:50 AM   Result Value Ref Range    Phosphorus 2.5 2.5 - 4.5 mg/dL   ESTIMATED GFR    Collection Time: 03/08/22  5:50 AM   Result Value Ref Range    GFR If African American >60 >60 mL/min/1.73 m 2    GFR If Non African American >60 >60 mL/min/1.73 m 2   POCT glucose device results    Collection Time: 03/08/22  6:00 AM   Result Value Ref Range    POC Glucose, Blood 110 (H) 65 - 99 mg/dL   Heparin Xa (Unfractionated)    Collection Time: 03/08/22  6:12 AM   Result Value Ref Range    Heparin Xa (UFH) 0.77 IU/mL   POCT glucose device results    Collection Time: 03/08/22 12:17 PM   Result Value Ref Range    POC Glucose, Blood 116 (H) 65 - 99 mg/dL         ASSESSMENT:  Patient is a 76 y.o. male admitted with left multivascular territory stroke     Rehabilitation: Impaired ADLs and mobility  NA    Additional Recommendations:  -In discussion with wife at bedside, patient is going comfort care tomorrow.  Life saving measures will be withdrawn.  Family is not interested in rehab.  I did take time to help answer questions, and discuss stroke burden and answer any questions they had regarding rehab potential.   -No role for PT OT SLP at this time  -Appreciate primary team managing current medical conditions  -PMR will sign off, please reconsult or reach out via Voalte if further evaluation or medical management is requested      Thank you for allowing us to participate in the care of this patient.     Patient was seen for 81 minutes on unit/floor of which > 50% of time was spent on counseling and coordination of care regarding the above, including prognosis, risk reduction, benefits of treatment, and options for next stage of care.    Roland Pratt, DO   Physical Medicine and Rehabilitation     Please note that this dictation was created using voice recognition software. I have made every  reasonable attempt to correct obvious errors, but there may be errors of grammar and possibly content that I did not discover before finalizing the note.

## 2022-03-08 NOTE — PROGRESS NOTES
[facility] ICU Progress Note      Admit Date: 3/4/2022    Resident(s): Gabriella Denson M.D.   Attending:  Dr. Addi Shabazz    Patient ID:    Name:  Adan Espinosa     YOB: 1945  Age:  76 y.o.  male   MRN:  [id number]    Hospital Course (carried forward and updated):  Adan Espinosa is a 76 y.o. male with known history of lumbabr fusion followed with a complicated MSSA associated infection requiring a washout in Dec 2021 with bacteremia with CVA without endocarditis in Jan 2022, HTN, HLD, that was admitted on  03/04/2022 for altered mental status and found via head brain MRI, pan CT and extremity ultrasound to have multiple level of thrombi likely secondary to septic emboli.    03/05: Awaiting for echo. Troponin increasing. Was started on heparin after weighing risks and benefirs. Discussed with family and code status change to DNR with intubation. Vascular and IR recommended against any surgical intervention.    03/06: clinically not worsening and no evidence of change in neuro status or worsening embolism. More responsive to command this morning.CT ordered for the AM as well as SALVATORE. Mild improvement tin leukocytosis. No evidence of abscess or fluid collection on MRI lumbar    03/07: SALVATORE ordered and Dr. Knutson from cardiology notified, SAT/SBT trials 3% nacl switched to hypertonic sodium acetate (maintain acid-base balance, so as not to become acidotic) ; goals of care discussion when able; may order atropine if sustains bradycardia /systolic <110; CT follow-up no hemorrhagic transformation, but large infarction of L frontal/parietal/occipidal lobe with wedge zone of infarct R hemicerebellum    03/08: has been decided by family that he will transition to confirm care tomorrow; I discussed with Keiko; meanwhile, family not wanting feeding tube, SALVATORE not done can empirically give abx given goals of care no intervention (discussed with Dr. Knutson); hypertonic saline and heparin stopped in line of decision for  comfort by family    Consultants:  Critical Care  IR  Vascular surgery    Vitals Range last 24h:  Pulse:  [60-83] 60  Resp:  [11-27] 27  SpO2:  [95 %-98 %] 96 %      Intake/Output Summary (Last 24 hours) at 3/8/2022 0914  Last data filed at 3/8/2022 0800  Gross per 24 hour   Intake 1112.53 ml   Output 1415 ml   Net -302.47 ml        Review of Systems   Unable to perform ROS: Intubated        PHYSICAL EXAM:  Vitals:    03/08/22 0600 03/08/22 0637 03/08/22 0700 03/08/22 0800   BP:       Pulse: 63 68 65 60   Resp: 18 16 18 (!) 27   Temp:       TempSrc:       SpO2: 97% 96% 97% 96%   Weight:       Height:        Body mass index is 28.34 kg/m².    O2 therapy: Pulse Oximetry: 96 %, O2 Delivery Device: Ventilator    Date 03/08/22 0700 - 03/09/22 0659   Shift 0368-4044 6934-4827 6421-2930 24 Hour Total   INTAKE   I.V. 65.2   65.2     Heparin Volume 65.2   65.2   Shift Total 65.2   65.2   OUTPUT   Shift Total       NET 65.2   65.2        Physical Exam  Constitutional:       General: He is in acute distress.      Appearance: He is ill-appearing and diaphoretic.   HENT:      Head: Normocephalic.      Nose: Nose normal.      Mouth/Throat:      Mouth: Mucous membranes are moist.   Eyes:      Pupils: Pupils are equal, round, and reactive to light.   Neck:      Comments: Unable to assess  Cardiovascular:      Rate and Rhythm: Normal rate and regular rhythm.      Pulses: Normal pulses.      Heart sounds: No murmur heard.  Pulmonary:      Effort: Pulmonary effort is normal.      Breath sounds: Normal breath sounds. No wheezing or rales.   Abdominal:      General: There is no distension.      Palpations: Abdomen is soft.      Tenderness: There is no abdominal tenderness.   Musculoskeletal:      Right lower leg: No edema.      Left lower leg: No edema.   Skin:     General: Skin is warm.   Neurological:      Mental Status: He is alert.      Comments: Bilateral LE withdraws to painful stimuli, UE left withdraws to painful stimuli ,  limited ability to assess motor and sensory given intubation   Psychiatric:      Comments: Unable to assess       Recent Labs     03/06/22  0427 03/07/22  0321   ISTATAPH 7.422 7.366*   ISTATAPCO2 32.4 33.3   ISTATAPO2 89* 120*   ISTATATCO2 22 20   WWSPJBC7FIX 97 99   ISTATARTHCO3 21.1 19.0   ISTATARTBE -3 -6*   ISTATTEMP 98.6 F 98.8 F   ISTATFIO2 40 40   ISTATSPEC Arterial Arterial   ISTATAPHTC 7.422 7.364*   [id number] 89* 121*     Recent Labs     03/06/22 0315 03/06/22 1100 03/07/22  0510 03/07/22  1105 03/07/22 1816 03/08/22  0016 03/08/22  0550   SODIUM 139   < > 148*   < > 155* 152* 151*   POTASSIUM 4.3  --  4.1  --   --   --  3.5*   CHLORIDE 110  --  120*  --   --   --  118*   CO2 19*  --  17*  --   --   --  24   BUN 24*  --  21  --   --   --  19   CREATININE 0.97  --  0.96  --   --   --  0.98   MAGNESIUM 2.2  --  2.3  --   --   --  2.1   PHOSPHORUS 3.3  --  3.2  --   --   --  2.5   CALCIUM 8.7  --  8.8  --   --   --  8.8    < > = values in this interval not displayed.     Recent Labs     03/06/22 0315 03/07/22  0510 03/07/22  1816 03/08/22  0550   PREALBUMIN  --   --  6.3*  --    GLUCOSE 142* 141*  --  138*     Recent Labs     03/06/22 0315 03/06/22  1100 03/07/22  0510 03/08/22  0550   RBC 3.50*  --  3.35* 3.25*   HEMOGLOBIN 9.5*  --  9.2* 8.9*   HEMATOCRIT 31.0*  --  30.0* 28.9*   PLATELETCT 229  --  214 209   PROTHROMBTM  --  17.5*  --   --    APTT  --  145.9*  --   --    INR  --  1.48*  --   --      Recent Labs     03/06/22 0315 03/07/22  0510 03/08/22  0550   WBC 11.1* 10.7 8.9   NEUTSPOLYS 80.90* 83.70* 81.50*   LYMPHOCYTES 8.90* 6.40* 8.80*   MONOCYTES 6.70 7.10 7.20   EOSINOPHILS 2.50 2.00 1.60   BASOPHILS 0.50 0.30 0.20       Meds:  • potassium bicarbonate  50 mEq     • Hypertonic Sodium Acetate (513 mEq Na Acetate per liter) infusion   15 mL/hr at 03/08/22 0805   • atropine  0.5 mg     • riFAMPin  300 mg     • Pharmacy  1 Each     • cefepime  2 g Stopped (03/08/22 0542)   • Respiratory  Therapy Consult       • famotidine  20 mg      Or   • famotidine  20 mg     • senna-docusate  2 Tablet      And   • polyethylene glycol/lytes  1 Packet      And   • magnesium hydroxide  30 mL      And   • bisacodyl  10 mg     • MD Alert...Adult ICU Electrolyte Replacement per Pharmacy       • lidocaine  2 mL     • fentaNYL  50 mcg      And   • fentaNYL  100 mcg      And   • fentaNYL   200 mcg/hr (03/08/22 0744)    And   • dexmedetomidine (Precedex) infusion  0-0.7 mcg/kg/hr 0.3 mcg/kg/hr (03/08/22 0313)   • insulin regular  1-6 Units      And   • dextrose bolus  25 g     • heparin  0-30 Units/kg/hr 16 Units/kg/hr (03/08/22 0729)   • hydrALAZINE  10 mg     • norepinephrine (Levophed) infusion  0-30 mcg/min Stopped (03/08/22 0000)        Procedures:  None     Imaging:  CT-HEAD W/O   Final Result      1.  Moderately large acute areas of infarction involving the left frontal, parietal, and occipital lobes. Additionally there is a smaller wedge-shaped area of acute infarction involving the right hemicerebellum inferiorly in      2.  Chronic area of infarction involving the right superior frontal lobe.      3.  Age-related cerebral atrophy.         DX-CHEST-PORTABLE (1 VIEW)   Final Result      No significant change      US-EXTREMITY ARTERY LOWER BILAT   Final Result      EC-ECHOCARDIOGRAM LTD W/ CONT   Final Result      US-EXTREMITY ARTERY UPPER UNILAT LEFT   Final Result      US-EXTREMITY VENOUS UPPER UNILAT LEFT   Final Result      US-EXTREMITY VENOUS LOWER BILAT   Final Result      MR-LUMBAR SPINE-WITH & W/O   Final Result      1.  Abnormal T2 fluid signal intensity in the L1, L2 and to a lesser extent the L5-S1 vertebral disc spaces. There is no definite evidence of adjacent marrow edema signal or enhancement in the opposing endplates. Early discitis at these levels cannot be    excluded. Short interval imaging follow-up is recommended as clinically appropriate.   2.  There is diffuse enhancement in the surgical bed  extending to the level of the dorsal thecal sac. There is no definite evidence of epidural fluid collection or abscess. Evaluation of the postoperative levels is somewhat limited by hardware magnetic    susceptibility artifact.   3.  Postsurgical changes of the lumbar spine with laminectomy defects extending from L2 to S1 right posterior hemifusion hardware at L4-5 and lateral fusion hardware L4-5. A disc spacer device appears present at L4-5.   4.  Multilevel degenerative changes of the lumbar spine as described above.      MR-BRAIN-WITH & W/O   Final Result      1.  Moderate to large sized acute to subacute infarcts in the left anterior cerebral artery territory, left parietal, right parietal, bilateral occipital lobes and right cerebellum. Patchy areas of enhancement are noted in the left anterior cerebral    artery territory and right cerebellum which is more consistent with subacute chronicity. There is no evidence of hemorrhagic transformation.   2.  Chronic right frontal lobe and bilateral cerebellar infarcts.   3.  Mild diffuse cerebral substance loss.   4.  Moderate microangiopathic ischemic change versus demyelination or gliosis.      CT-CTA NECK WITH & W/O-POST PROCESSING   Final Result      CT angiogram of the neck within normal limits.      CT-CTA HEAD WITH & W/O-POST PROCESS   Final Result      CT angiogram of the [place] within normal limits.      CT-CEREBRAL PERFUSION ANALYSIS   Final Result      1.  Cerebral blood flow less than 30% likely representing completed infarct = 0 mL.      2.  T Max more than 6 seconds likely representing combination of completed infarct and ischemia = 27 mL.      3.  Mismatched volume likely representing ischemic brain/penumbra = 27      4.  Please note that the cerebral perfusion was performed on the limited brain tissue around the basal ganglia region. Infarct/ischemia outside the CT perfusion sections can be missed in this study.      CT-ABDOMEN-PELVIS WITH    Final Result      1.  Several limited areas of decreased enhancement in the spleen are present consistent with splenic infarcts.      2.  Renal infarcts also appear to be present in the parenchyma of the left kidney.      3.  Tubular filling defect consistent with thrombus is identified in the aorta as described above.      4.  Some wall thickening appears to be present in the cecum which is not fully distended. This could be due to lack of distention or spasm although colitis is also possible.      5.  Consolidation and volume loss in the left lung base.      CT-HEAD W/O   Final Result         1.  Brain swelling and edema and right cerebellar hemisphere is noted which could indicate subacute infarct.      2.  Evidence of previous right frontal lobe infarct with encephalomalacia.      3.  No intracranial hemorrhage.      Decreased attenuation in the cerebral white matter likely indicates microvascular ischemic disease.         CT-CTA CHEST PULMONARY ARTERY W/ RECONS   Final Result      1.  Exam is positive for significant pulmonary emboli bilaterally.      2.  Abnormally elevated RV LV ratio is present.      3.  Consolidation and volume loss present in much of the left lower lobe could be due to pneumonia. Pulmonary infarct is also possible.      These findings were discussed with BON FALCON on 03/05/2022. Abdominal CT results also discussed..      DX-CHEST-PORTABLE (1 VIEW)   Final Result         1.  Right central line noted with tip at the level of the SVC. No pneumothorax identified. Endotracheal tube is noted in place.      DX-CHEST-PORTABLE (1 VIEW)   Final Result         1.  Elevation of left hemidiaphragm and probable atelectasis in the left lung base. Developing pneumonitis or pneumonia is also possible.      2.  No other infiltrates or consolidations identified.      EC-SALVATORE W/O CONT    (Results Pending)   DX-CHEST-PORTABLE (1 VIEW)    (Results Pending)       ASSESSEMENT and PLAN:    * Septic shock  ([facility])  Assessment & Plan  This is Septic shock Present on admission  SIRS criteria identified on my evaluation include: Tachycardia, with heart rate greater than 90 BPM, Tachypnea, with respirations greater than 20 per minute and Leukocytosis, with WBC greater than 12,000  Source is likely staph bacteremia with septic emboli  Presentation includes: Severe sepsis present, persistent hypotension after 30 ml/kg completed, and initial lactate level result is >= 4 mmol/L.   Patient was transferred to ICU for pressor control. [facility] and he underwent treatment with adherence to his meds  Current, the covering is cefepime and vancomycin and awaiting for blood cultures, thus far NGTD on all cultures       SALVATORE cancelled in light of goals of care decision        Encephalopathy  Assessment & Plan  This likely to be secondary to septic shock and with the moderate size subacute infarct found on imaging. There are just too many areas on hyperdensity that would correspond to brain injury that could jeopardize any future functional activity  This is ultimately associated with septic embolism secondary to infection that is also a main cause of AMS      S/P lumbar fusion  Assessment & Plan  Lumbar fusion for lumbar stenosis with neurologic claudication in November 2021 with multiple complications as above and below      Wound infection complicating hardware, sequela  Assessment & Plan  Patient had lumbar fusion in November 2021, this was complicated in December by a MSSA paraspinal infection likely including hardware , requiring wound washout and long course of antibiotics including rifampin at home  Patient had recurrent bacteremia in January-however could not find evidence that lumbar spine was evaluated at that time again.  Per wife patient has pain intermittently at site, but in the last few days has had increasing pain and some swelling around the site  MRI of lumbar spine ordered  On  Vanco and cefepime    Septic embolism (HCC)  Assessment & Plan  Patient with clots noted in both venous (both PE, and stroke), also with arterial thrombus in aorta and renal and splenic infarcts  With history of continued MSSA bacteremia post surgery with hardware infection very likely patient still has staph bacteremia which has led to septic emboli and infarcts  On appropriate antibiotics  No evidence of infection r fluid collection or abscess on MRI lumbar  TTE without evidence of patent foramen ovale or endocarditis  Anticoagulation recommended for aortic thrombus and PE, however with multiple septic emboli strokes of different ages high risk for hemorrhagic conversion     ; repeat CT negative yesterday no hemorrhagic    transformation, but large infarction of L    frontal/parietal/occipidal lobe with wedge zone of infarct R    hemicerebellum    It has been decided by family that he will transition to confirm care tomorrow when daughter Claudia arrives; meanwhile, family not wanting feeding tube, SALVATORE not done can empirically give abx given goals of care no intervention (discussed with Dr. Knutson); sodium stopped    Cerebrovascular accident (CVA) due to embolism (HCC)  Assessment & Plan  Patient was admitted with AMS and obtunded  CT brain, MRI, and CTA of neck consistent with multiple sub infarcts suspected to be secondary to septic embolism  It is unknown if patient had a previously found patent foramen ovale or if a valvular vegetation has occurred in the past to result in a multi-level of embolism  Another cause could be emboli from thrombi found into the aorta  COVID antibodies have been negative  Patient was started on heparin after discussion of risks and benefits with family and the possible complication of hemorrhagic transformation.   The window is very small in regard of option given the bilateral PE and thrombi in the spleen and kidneys  Close follow up with Q2H neuro check      Renal infarct  (HCC)  Assessment & Plan  Likely due to septic emboli possibly from aortic thrombus  Monitor renal function    Splenic infarct  Assessment & Plan  Multiple embolic infarcts noted, likely secondary to aortic thrombus/septic emboli    Aortic thrombus (HCC)  Assessment & Plan  Aortic thrombus 1.2 cm diameter-6 cm length and abdominal aorta above the level of renal arteries  With patient's history of septic emboli and infection, likely this is a septic thrombus  Complicated by renal and splenic infarcts  Discussed with Dr Bai the [facility] surgeon, and he agrees that current treatment with heparin despite the risks is probably the best course of action    Pulmonary embolism (HCC)  Assessment & Plan  Patient was found to have bilateral embolism in the lungs.  This has been thought to be secondary to septic embolism  The issue has been with the concomitant thrombi found in the brain, and the likelihood of hemorrhagic transformation  Risks and benefits were assessed and discussed with family  Vascular surgery and IR were consulted and agreed that despite patient being hemodynamically unstable, there is a high risk of bleeding should we go the tPA route or thromboembolectomy   Was elected to start with heparin and to watch for hemorrhage transformation in the brain that is a high possible complication.    Acute respiratory failure with hypoxia (HCC)  Assessment & Plan  Patient admitted with a requirement of 6L to maintain saturation  He was found to have a PE and consolidation in the left lung  CT CTA was done showing bilateral PE and Consolidation and volume loss present in much of the left lower lobe   He is currently on heparin and on cefepime and vancomycin  Will be extubated tomorrow when official transition to comfort care      CODE STATUS: DNR, JOSE Denson M.D.

## 2022-03-09 PROBLEM — Z51.5 HOSPICE CARE PATIENT: Status: ACTIVE | Noted: 2022-01-01

## 2022-03-09 NOTE — CARE PLAN
Ventilator Daily Summary    Vent Day # 5  8.0 @ 26  APV : 18, 500, +8, 35%    Ventilator settings changed this shift: None    Weaning trials: Pt not eligible at this time    Respiratory Procedures: None    Plan: Continue current ventilator settings and wean mechanical ventilation as tolerated per physician orders.

## 2022-03-09 NOTE — PROGRESS NOTES
[facility] ICU Progress Note      Admit Date: 3/4/2022    Resident(s): Gabriella Denson M.D.   Attending:  Dr. Addi Shabazz    Patient ID:    Name:  Adan Espinosa     YOB: 1945  Age:  76 y.o.  male   MRN:  [id number]    Hospital Course (carried forward and updated):  Adan Espinosa is a 76 y.o. male with known history of lumbabr fusion followed with a complicated MSSA associated infection requiring a washout in Dec 2021 with bacteremia with CVA without endocarditis in Jan 2022, HTN, HLD, that was admitted on  03/04/2022 for altered mental status and found via head brain MRI, pan CT and extremity ultrasound to have multiple level of thrombi likely secondary to septic emboli.    03/05: Awaiting for echo. Troponin increasing. Was started on heparin after weighing risks and benefirs. Discussed with family and code status change to DNR with intubation. Vascular and IR recommended against any surgical intervention.    03/06: clinically not worsening and no evidence of change in neuro status or worsening embolism. More responsive to command this morning.CT ordered for the AM as well as SALVATORE. Mild improvement tin leukocytosis. No evidence of abscess or fluid collection on MRI lumbar    03/07: SALVATORE ordered and Dr. Knutson from cardiology notified, SAT/SBT trials 3% nacl switched to hypertonic sodium acetate (maintain acid-base balance, so as not to become acidotic) ; goals of care discussion when able; may order atropine if sustains bradycardia /systolic <110; CT follow-up no hemorrhagic transformation, but large infarction of L frontal/parietal/occipidal lobe with wedge zone of infarct R hemicerebellum    03/08: has been decided by family that he will transition to confirm care tomorrow; I discussed with Keiko; meanwhile, family not wanting feeding tube, SALVATORE not done can empirically give abx given goals of care no intervention (discussed with Dr. Knutson); hypertonic saline and heparin stopped in line of decision for  "comfort by family    03/09: pt family here, comfort care orders in and patient to be extubated; meanwhile, no lab draws, discontinued meds not aligned with comfort     Consultants:  Critical Care  IR  Vascular surgery    Vitals Range last 24h:  Pulse:  [] 57  Resp:  [17-24] 18  SpO2:  [94 %-98 %] 96 %      Intake/Output Summary (Last 24 hours) at 3/9/2022 0923  Last data filed at 3/9/2022 0800  Gross per 24 hour   Intake 1729.9 ml   Output 775 ml   Net 954.9 ml        Review of Systems   Unable to perform ROS: Intubated        PHYSICAL EXAM:  Vitals:    03/09/22 0600 03/09/22 0700 03/09/22 0706 03/09/22 0800   BP:       Pulse: (!) 58 (!) 59 61 (!) 57   Resp: 18 18 17 18   Temp:       TempSrc: Bladder      SpO2: 95% 96% 96% 96%   Weight:       Height:   1.88 m (6' 2.02\")     Body mass index is 26.82 kg/m².    O2 therapy: Pulse Oximetry: 96 %, O2 Delivery Device: Ventilator    Date 03/09/22 0700 - 03/10/22 0659   Shift 2268-6271 9560-3972 6106-6037 24 Hour Total   INTAKE   I.V. 164   164     Precedex Volume 14   14     Volume (mL) (lactated ringers infusion) 150   150   Shift Total 164   164   OUTPUT   Shift Total          164        Physical Exam  Constitutional:       General: He is in acute distress.      Appearance: He is ill-appearing and diaphoretic.   HENT:      Head: Normocephalic.      Nose: Nose normal.      Mouth/Throat:      Mouth: Mucous membranes are moist.   Eyes:      Pupils: Pupils are equal, round, and reactive to light.   Neck:      Comments: Unable to assess  Cardiovascular:      Rate and Rhythm: Normal rate and regular rhythm.      Pulses: Normal pulses.      Heart sounds: No murmur heard.  Pulmonary:      Effort: Pulmonary effort is normal.      Breath sounds: Normal breath sounds. No wheezing or rales.   Abdominal:      General: There is no distension.      Palpations: Abdomen is soft.      Tenderness: There is no abdominal tenderness.   Musculoskeletal:      Right lower leg: No " edema.      Left lower leg: No edema.   Skin:     General: Skin is warm.   Neurological:      Mental Status: He is alert.      Comments: Bilateral LE withdraws to painful stimuli, UE left withdraws to painful stimuli , limited ability to assess motor and sensory given intubation   Psychiatric:      Comments: Unable to assess       Recent Labs     03/07/22  0321   ISTATAPH 7.366*   ISTATAPCO2 33.3   ISTATAPO2 120*   ISTATATCO2 20   [id number] 99   ISTATARTHCO3 19.0   ISTATARTBE -6*   ISTATTEMP 98.8 F   ISTATFIO2 40   ISTATSPEC Arterial   ISTATAPHTC 7.364*   [id number] 121*     Recent Labs     03/07/22  0510 03/07/22  1105 03/07/22  1816 03/08/22  0016 03/08/22  0550   SODIUM 148*   < > 155* 152* 151*   POTASSIUM 4.1  --   --   --  3.5*   CHLORIDE 120*  --   --   --  118*   CO2 17*  --   --   --  24   BUN 21  --   --   --  19   CREATININE 0.96  --   --   --  0.98   MAGNESIUM 2.3  --   --   --  2.1   PHOSPHORUS 3.2  --   --   --  2.5   CALCIUM 8.8  --   --   --  8.8    < > = values in this interval not displayed.     Recent Labs     03/07/22  0510 03/07/22  1816 03/08/22  0550   PREALBUMIN  --  6.3*  --    GLUCOSE 141*  --  138*     Recent Labs     03/06/22  1100 03/07/22  0510 03/08/22  0550   RBC  --  3.35* 3.25*   HEMOGLOBIN  --  9.2* 8.9*   HEMATOCRIT  --  30.0* 28.9*   PLATELETCT  --  214 209   PROTHROMBTM 17.5*  --   --    APTT 145.9*  --   --    INR 1.48*  --   --      Recent Labs     03/07/22  0510 03/08/22  0550   WBC 10.7 8.9   NEUTSPOLYS 83.70* 81.50*   LYMPHOCYTES 6.40* 8.80*   MONOCYTES 7.10 7.20   EOSINOPHILS 2.00 1.60   BASOPHILS 0.30 0.20       Meds:  • LR   75 mL/hr at 03/09/22 0147   • riFAMPin  300 mg     • Pharmacy  1 Each     • cefepime  2 g Stopped (03/08/22 2252)   • Respiratory Therapy Consult       • famotidine  20 mg      Or   • famotidine  20 mg     • senna-docusate  2 Tablet      And   • polyethylene glycol/lytes  1 Packet      And   • magnesium hydroxide  30 mL      And   • bisacodyl  10  mg     • MD Alert...Adult ICU Electrolyte Replacement per Pharmacy       • lidocaine  2 mL     • fentaNYL  50 mcg      And   • fentaNYL  100 mcg      And   • fentaNYL   200 mcg/hr (03/09/22 0148)    And   • dexmedetomidine (Precedex) infusion  0-0.7 mcg/kg/hr 0.3 mcg/kg/hr (03/09/22 0552)   • insulin regular  1-6 Units      And   • dextrose bolus  25 g     • norepinephrine (Levophed) infusion  0-30 mcg/min Stopped (03/08/22 0000)        Procedures:  None     Imaging:  CT-HEAD W/O   Final Result      1.  Moderately large acute areas of infarction involving the left frontal, parietal, and occipital lobes. Additionally there is a smaller wedge-shaped area of acute infarction involving the right hemicerebellum inferiorly in      2.  Chronic area of infarction involving the right superior frontal lobe.      3.  Age-related cerebral atrophy.         DX-CHEST-PORTABLE (1 VIEW)   Final Result      No significant change      US-EXTREMITY ARTERY LOWER BILAT   Final Result      EC-ECHOCARDIOGRAM LTD W/ CONT   Final Result      US-EXTREMITY ARTERY UPPER UNILAT LEFT   Final Result      US-EXTREMITY VENOUS UPPER UNILAT LEFT   Final Result      US-EXTREMITY VENOUS LOWER BILAT   Final Result      MR-LUMBAR SPINE-WITH & W/O   Final Result      1.  Abnormal T2 fluid signal intensity in the L1, L2 and to a lesser extent the L5-S1 vertebral disc spaces. There is no definite evidence of adjacent marrow edema signal or enhancement in the opposing endplates. Early discitis at these levels cannot be    excluded. Short interval imaging follow-up is recommended as clinically appropriate.   2.  There is diffuse enhancement in the surgical bed extending to the level of the dorsal thecal sac. There is no definite evidence of epidural fluid collection or abscess. Evaluation of the postoperative levels is somewhat limited by hardware magnetic    susceptibility artifact.   3.  Postsurgical changes of the lumbar spine with laminectomy defects  extending from L2 to S1 right posterior hemifusion hardware at L4-5 and lateral fusion hardware L4-5. A disc spacer device appears present at L4-5.   4.  Multilevel degenerative changes of the lumbar spine as described above.      MR-BRAIN-WITH & W/O   Final Result      1.  Moderate to large sized acute to subacute infarcts in the left anterior cerebral artery territory, left parietal, right parietal, bilateral occipital lobes and right cerebellum. Patchy areas of enhancement are noted in the left anterior cerebral    artery territory and right cerebellum which is more consistent with subacute chronicity. There is no evidence of hemorrhagic transformation.   2.  Chronic right frontal lobe and bilateral cerebellar infarcts.   3.  Mild diffuse cerebral substance loss.   4.  Moderate microangiopathic ischemic change versus demyelination or gliosis.      CT-CTA NECK WITH & W/O-POST PROCESSING   Final Result      CT angiogram of the neck within normal limits.      CT-CTA HEAD WITH & W/O-POST PROCESS   Final Result      CT angiogram of the [place] within normal limits.      CT-CEREBRAL PERFUSION ANALYSIS   Final Result      1.  Cerebral blood flow less than 30% likely representing completed infarct = 0 mL.      2.  T Max more than 6 seconds likely representing combination of completed infarct and ischemia = 27 mL.      3.  Mismatched volume likely representing ischemic brain/penumbra = 27      4.  Please note that the cerebral perfusion was performed on the limited brain tissue around the basal ganglia region. Infarct/ischemia outside the CT perfusion sections can be missed in this study.      CT-ABDOMEN-PELVIS WITH   Final Result      1.  Several limited areas of decreased enhancement in the spleen are present consistent with splenic infarcts.      2.  Renal infarcts also appear to be present in the parenchyma of the left kidney.      3.  Tubular filling defect consistent with thrombus is identified in the aorta  as described above.      4.  Some wall thickening appears to be present in the cecum which is not fully distended. This could be due to lack of distention or spasm although colitis is also possible.      5.  Consolidation and volume loss in the left lung base.      CT-HEAD W/O   Final Result         1.  Brain swelling and edema and right cerebellar hemisphere is noted which could indicate subacute infarct.      2.  Evidence of previous right frontal lobe infarct with encephalomalacia.      3.  No intracranial hemorrhage.      Decreased attenuation in the cerebral white matter likely indicates microvascular ischemic disease.         CT-CTA CHEST PULMONARY ARTERY W/ RECONS   Final Result      1.  Exam is positive for significant pulmonary emboli bilaterally.      2.  Abnormally elevated RV LV ratio is present.      3.  Consolidation and volume loss present in much of the left lower lobe could be due to pneumonia. Pulmonary infarct is also possible.      These findings were discussed with BON FALCON on 03/05/2022. Abdominal CT results also discussed..      DX-CHEST-PORTABLE (1 VIEW)   Final Result         1.  Right central line noted with tip at the level of the SVC. No pneumothorax identified. Endotracheal tube is noted in place.      DX-CHEST-PORTABLE (1 VIEW)   Final Result         1.  Elevation of left hemidiaphragm and probable atelectasis in the left lung base. Developing pneumonitis or pneumonia is also possible.      2.  No other infiltrates or consolidations identified.      DX-CHEST-PORTABLE (1 VIEW)    (Results Pending)       ASSESSEMENT and PLAN:    * Septic shock (HCC)  Assessment & Plan  This is Septic shock Present on admission  SIRS criteria identified on my evaluation include: Tachycardia, with heart rate greater than 90 BPM, Tachypnea, with respirations greater than 20 per minute and Leukocytosis, with WBC greater than 12,000  Source is likely staph bacteremia with septic emboli  Presentation  includes: Severe sepsis present, persistent hypotension after 30 ml/kg completed, and initial lactate level result is >= 4 mmol/L.   Patient was transferred to ICU for pressor control. [facility] and he underwent treatment with adherence to his meds  Was on cefepime and vancomycin      SALVATORE cancelled in light of goals of care decision        Encephalopathy  Assessment & Plan  This likely to be secondary to septic shock and with the moderate size subacute infarct found on imaging. There are just too many areas on hyperdensity that would correspond to brain injury that could jeopardize any future functional activity  This is ultimately associated with septic embolism secondary to infection that is also a main cause of AMS      S/P lumbar fusion  Assessment & Plan  Lumbar fusion for lumbar stenosis with neurologic claudication in November 2021 with multiple complications as above and below      Wound infection complicating hardware, sequela  Assessment & Plan  Patient had lumbar fusion in November 2021, this was complicated in December by a MSSA paraspinal infection likely including hardware , requiring wound washout and long course of antibiotics including rifampin at home  Patient had recurrent bacteremia in January-however could not find evidence that lumbar spine was evaluated at that time again.  Per wife patient has pain intermittently at site, but in the last few days has had increasing pain and some swelling around the site  MRI of lumbar spine ordered  Was on Vanco and cefepime, stopped with comfort care orders    Septic embolism (HCC)  Assessment & Plan  Patient with clots noted in both venous (both PE, and stroke), also with arterial thrombus in aorta and renal and splenic infarcts  With history of continued MSSA bacteremia post surgery with hardware infection very likely patient still has staph bacteremia which has led to septic emboli and infarcts  On appropriate  antibiotics  No evidence of infection r fluid collection or abscess on MRI lumbar  TTE without evidence of patent foramen ovale or endocarditis  Anticoagulation recommended for aortic thrombus and PE, however with multiple septic emboli strokes of different ages high risk for hemorrhagic conversion     ; repeat CT negative yesterday no hemorrhagic    transformation, but large infarction of L    frontal/parietal/occipidal lobe with wedge zone of infarct R    hemicerebellum    It has been decided by family that he will transition to confirm care today    Cerebrovascular accident (CVA) due to embolism (HCC)  Assessment & Plan  Patient was admitted with AMS and obtunded  CT brain, MRI, and CTA of neck consistent with multiple sub infarcts suspected to be secondary to septic embolism  It is unknown if patient had a previously found patent foramen ovale or if a valvular vegetation has occurred in the past to result in a multi-level of embolism  Another cause could be emboli from thrombi found into the aorta  COVID antibodies have been negative  Patient was started on heparin after discussion of risks and benefits with family and the possible complication of hemorrhagic transformation. This was stopped in line with comfort care  The window is very small in regard of option given the bilateral PE and thrombi in the spleen and kidneys        Renal infarct (HCC)  Assessment & Plan  Likely due to septic emboli possibly from aortic thrombus  Monitor renal function    Splenic infarct  Assessment & Plan  Multiple embolic infarcts noted, likely secondary to aortic thrombus/septic emboli    Aortic thrombus (HCC)  Assessment & Plan  Aortic thrombus 1.2 cm diameter-6 cm length and abdominal aorta above the level of renal arteries  With patient's history of septic emboli and infection, likely this is a septic thrombus  Complicated by renal and splenic infarcts      Pulmonary embolism (HCC)  Assessment & Plan  Patient was found to have  bilateral embolism in the lungs.  This has been thought to be secondary to septic embolism  The issue has been with the concomitant thrombi found in the brain, and the likelihood of hemorrhagic transformation  Risks and benefits were assessed and discussed with family  Vascular surgery and IR were consulted and agreed that despite patient being hemodynamically unstable, there is a high risk of bleeding should we go the tPA route or thromboembolectomy   Was elected to start with heparin , which was later stopped as noted above    Acute respiratory failure with hypoxia (HCC)  Assessment & Plan  Patient admitted with a requirement of 6L to maintain saturation  He was found to have a PE and consolidation in the left lung  CT CTA was done showing bilateral PE and Consolidation and volume loss present in much of the left lower lobe   He was on heparin, cefepime and vancomycin which was stopped for comfort care  Will be extubated       CODE STATUS: DNR, JOSE Denson M.D.

## 2022-03-09 NOTE — PROGRESS NOTES
Patient transitioned to comfort care at 1107.   Wife and daughter at bedside for extubation.  Medications given prior; see MAR.

## 2022-03-09 NOTE — DIETARY
Nutrition Services:  RD previously following patient while on TF. Patient now transitioned to comfort care. Re-consult RD as needed.     RD available prn.

## 2022-03-09 NOTE — THERAPY
Physical Therapy Contact Note.     Patient Name: Adan Espinosa  Age:  76 y.o., Sex:  male  Medical Record #: [id number]  Today's Date: 3/9/2022      Spoke with RN, pt transitioning to comfort care. Cancel PT order.

## 2022-03-09 NOTE — THERAPY
OT contact note, cancelling order per RN. See below.        03/09/22 1047   Interdisciplinary Plan of Care Collaboration   IDT Collaboration with  Nursing   Collaboration Comments Spoke with RN, patient going comfort care, will cancel order. Please re-consult as needed.   Session Information   Date / Session Number  3/9 cancel order   Priority 0     Kristine Oconnor OTR/L

## 2022-03-09 NOTE — CARE PLAN
Problem: Nutritional:  Goal: Nutrition support tolerated and meeting greater than 85% of estimated needs  Outcome: Discharged - Not Met    Noted plan for transition to comfort care today, tube feeding discontinued per family decision.    RD remains available prn.

## 2022-03-09 NOTE — CARE PLAN
The patient is Watcher - Medium risk of patient condition declining or worsening    Shift Goals  Clinical Goals: Maintain hemodynamics, Q2 neuro checks.  Patient Goals: SHAMIR  Family Goals: SHAMIR    Progress made toward(s) clinical / shift goals:  VSS, unchanged neuro checks    Patient is not progressing towards the following goals: n/a      Problem: Neuro Status  Goal: Neuro status will remain stable or improve  Outcome: Not Progressing   Neuro status has not changed/improved    Problem: Safety - Medical Restraint  Goal: Remains free of injury from restraints (Restraint for Interference with Medical Device)  Outcome: Progressing

## 2022-03-09 NOTE — DOCUMENTATION QUERY
[facility]                                                                       Query Response Note      PATIENT:               ANISA STRINGER  ACCT #:                  [id number]  MRN:                     [id number]  :                      1945  ADMIT DATE:       3/4/2022 10:34 PM  DISCH DATE:          RESPONDING  PROVIDER #:        [id number]           QUERY TEXT:    Bacteremia has developed in the postoperative period.  Can the relationship between bacteremia and previous lumbar fusion and/or washout surgery be further specified?    NOTE:  If an appropriate response is not listed below, please respond with a new note.        The patient's Clinical Indicators include:  Per Critical Care Consult:  Septic shock POA, source is likely staph bacteremia with septic emboli.  Wound infection complicating hardware, sequela.  PMH of lumbar fusion in November complicated by MSSA surgical site incision of the lumbar spine requiring washout in December, and MSSA bacteremia  Per 3/6 Progress Note: no evidence of abscess or fluid collection on MRI lumbar   Risk Factors: history of lumbar fusion w/ postop infection, bacteremia, s/p washout.    Treatment: cefepime, vanco, IVF, MRI spine     Thank you,  Fahad Pacheco RN, BSN   Clinical   Connect via [facility] [employer]  Options provided:   -- Bacteremia is unexpected and a complication of the procedure performed   -- Bacteremia is related to another etiology, (please document under lying etiology)   -- Bacteremia is incidental and without clinical significance   -- Unable to determine      Query created by: Fahad Pacheco on 3/7/2022 8:37 AM    RESPONSE TEXT:    Bacteremia is unexpected and a complication of the procedure performed          Electronically signed by:  FAIZA MARIN MD 3/9/2022 7:12 AM

## 2022-03-09 NOTE — HOSPICE
Hospice H&P  Author: Katerine Fragoso M.D.     Date & Time note created:    3/9/2022   2:34 PM       Date of Consult: 3/9/22  Requesting Physician: Dr. Shabazz  Consulting Physician: Katerine Fragoso M.D.   Reason for Consult: Uncontrolled pain and shortness of breath    History of Present Illness:    Adan Espinosa is 76 y.o. male with who underwent lumbar laminectomy and fusion Nov 2021 and developed post-surgical MSSA wound infection Dec 2021 and was placed on IV antibiotics. He then developed confusion Jan 2022 and found with multiple acute and subacute cerebral infarcts, concerning for septic emboli. He had a SALVATORE that was negative for endocarditis, but showed possible PFO. His IV antibiotics were adjusted per infectious disease. He then presented to [facility] 3/4/22 with further confusion and found with multiple new strokes, splenic and renal infarcts, and bilateral PEs, all thought to be due to septic emboli. He was hypoxic and intubated for airway protection and admitted to ICU with septic shock on broad spectrum antibiotics and vasopressors. Critical care, neurology and vascular surgery were involved in his care. Weighing the risks of treatment for his strokes and PEs with the risk of bleeding, he was started on heparin infusion. Next steps of workup with SALVATORE was planned, however family elected to transition the patient to comfort care given the lack of neurologic improvement and poor prognosis. Patient was extubated while on fentanyl infusion and since then he has had signs of pain, shortness of breath, and restlessness. He has needed increase in fentanyl infusion as well as additional IV doses fentanyl and morphine about every hour and IV ativan about every hour and remains uncomfortable. Hospice was consulted for GIP management.         Review of Systems:     Deferred    Physical Exam:  deferred    Past Medical History:   No past medical history on file.    Past Surgical History:  No past surgical history on  file.    Current Outpatient Medications:  Home Medications    **Home medications have not yet been reviewed for this encounter**         Medication Allergy/Sensitivities:  No Known Allergies    Family History:  No family history on file.    Social History:  Social History     Tobacco Use   • Smoking status: Unknown If Ever Smoked         Vitals:  Weight/BMI: There is no height or weight on file to calculate BMI.  There were no vitals taken for this visit.  There were no vitals filed for this visit.  Oxygen Therapy:         Lab Data Review:  Recent Results (from the past 24 hour(s))   POCT glucose device results    Collection Time: 03/08/22  4:47 PM   Result Value Ref Range    POC Glucose, Blood 107 (H) 65 - 99 mg/dL   POCT glucose device results    Collection Time: 03/09/22 12:06 AM   Result Value Ref Range    POC Glucose, Blood 114 (H) 65 - 99 mg/dL   POCT glucose device results    Collection Time: 03/09/22  6:09 AM   Result Value Ref Range    POC Glucose, Blood 109 (H) 65 - 99 mg/dL       Imaging/Procedures Review:    No orders to display          Problem List:  1. Septic shock  2. Septic Emboli  3. CVA due to emboli  4. Pulmonary emboli  5. Acute respiratory failure  6. Lumbar wound infection  7. Splenic infarct  8. Renal infarct  Hospice general inpatient DNR/DNI      Discussion: This patient requires a high level of nursing care for pain control and symptom management. The patient is being admitted to hospice general inpatient to manage symptoms of pain, dyspnea and agitation. We will continue to uptitrate fentanyl infusion and add versed infusion with additional IV morphine and IV ativan as needed for comfort measures.    The patient will be followed by [facility] team on a daily basis to assess for symptom control as well as appropriateness for GIP level of care. All findings have been reviewed with hospice medical director.      Katerine Fragoso M.D.  Hospice and Palliative Medicine  03/09/22

## 2022-03-09 NOTE — DISCHARGE PLANNING
Anticipated Discharge Disposition: GIP, comfort care    Action: Patient transitioned to comfort care this morning with family at bedside. RN RAY met with Matthieu from [facility] who reports patient is a candidate for GIP.  Met with wife at bedside and choice form obtained. Faxed choice to [facility].    Barriers to Discharge: GIP, comfort care     Plan: HCM to continue to follow for discharge planning needs and barriers

## 2022-03-10 NOTE — DISCHARGE SUMMARY
Death Summary    Cause of Death  Septic shock due to unknown etiology    Comorbid Conditions at the Time of Death  Principal Problem:    Hospice care patient POA: Yes  Resolved Problems:    * No resolved hospital problems. *      History of Presenting Illness and Hospital Course  Adan Espinosa is 76 y.o. male with who underwent lumbar laminectomy and fusion Nov 2021 and developed post-surgical MSSA wound infection Dec 2021 and was placed on IV antibiotics. He then developed confusion Jan 2022 and found with multiple acute and subacute cerebral infarcts, concerning for septic emboli. He had a SALVATORE that was negative for endocarditis, but showed possible PFO. His IV antibiotics were adjusted per infectious disease. He then presented to [facility] 3/4/22 with further confusion and found with multiple new strokes, splenic and renal infarcts, and bilateral PEs, all thought to be due to septic emboli. He was hypoxic and intubated for airway protection and admitted to ICU with septic shock on broad spectrum antibiotics and vasopressors. Blood, urine, and respiratory cultures done at [facility] had no growth. Critical care, neurology and vascular surgery were involved in his care. Weighing the risks of treatment for his strokes and PEs with the risk of bleeding, he was started on heparin infusion. Dopplers showed acute arterial thrombus in right popliteal artery and DVTs in right and left legs . TTE did not show any thrombus or endocarditis. Next steps of workup with SALVATORE was planned, however family elected to transition the patient to comfort care given the lack of neurologic improvement and poor prognosis. He was extubated and admitted to [facility] services for management of symptoms. He was titrated on comfort measure medications and passed as expected.    Death Date: 03/10/22   Death Time: 0742         Pronounced By (RN1): Brenda Lujan Rn  Pronounced By (RN2): Inocencia Cervantes RN

## 2022-03-11 NOTE — PROGRESS NOTES
CMS restraint review recorded in Quality log on  3/11/2022 @ 8786.    See hospitalized hospice for restraints discontinuation.

## 2022-04-04 NOTE — DISCHARGE SUMMARY
Discharge Summary    CHIEF COMPLAINT ON ADMISSION  Chief Complaint   Patient presents with   • ALOC     Pt BIBA from home after being altered per wife for the last hour. Pt found to be 86% on RA and was placed on 6 L n/c. Pt had spinal [facility] and had post-op complication and placed on antibiotics which he's been compliant with. Pt responsive to painful stimuli on arrival. GCS 9. . Pt BP 90/57 on arrival.        Reason for Admission  EMS     Admission Date  3/4/2022    CODE STATUS  Prior    HPI & HOSPITAL COURSE  Adan Espinosa is a 76 y.o. male with known history of lumbar fusion followed with a complicated MSSA associated infection requiring a washout in Dec 2021 with bacteremia with CVA without endocarditis in Jan 2022, HTN, HLD, that was admitted on  03/04/2022 for altered mental status and found via head brain MRI, pan CT and extremity ultrasound to have multiple level of thrombi likely secondary to septic emboli.     03/05: Awaiting for echo. Troponin increasing. Was started on heparin after weighing risks and benefirs. Discussed with family and code status change to DNR with intubation. Vascular and IR recommended against any surgical intervention.     03/06: clinically not worsening and no evidence of change in neuro status or worsening embolism. More responsive to command this morning.CT ordered for the AM as well as SALVATORE. Mild improvement tin leukocytosis. No evidence of abscess or fluid collection on MRI lumbar     03/07: SALVATORE ordered and Dr. Knutson from cardiology notified, SAT/SBT trials 3% nacl switched to hypertonic sodium acetate (maintain acid-base balance, so as not to become acidotic) ; goals of care discussion when able; may order atropine if sustains bradycardia /systolic <110; CT follow-up no hemorrhagic transformation, but large infarction of L frontal/parietal/occipidal lobe with wedge zone of infarct R hemicerebellum     03/08: has been decided by family that he  Patient scheduled with [facility].   will transition to confirm care tomorrow; I discussed with Keiko; meanwhile, family not wanting feeding tube, SALVATORE not done can empirically give abx given goals of care no intervention (discussed with Dr. Knutson); hypertonic saline and heparin stopped in line of decision for comfort by family     03/09: pt family in, comfort care orders in and patient to be extubated; meanwhile, no lab draws, discontinued meds not aligned with comfort . Consulted inpatient hospice to discharge to their service.      Therefore, he is discharged in fair  condition to inpatient hospice.    The patient met 2-midnight criteria for an inpatient stay at the time of discharge.    Discharge Date  3/9/2022    FOLLOW UP ITEMS POST DISCHARGE  none    DISCHARGE DIAGNOSES  Principal Problem:    Septic shock (HCC) POA: Unknown  Active Problems:    Acute respiratory failure with hypoxia (HCC) POA: Unknown    Pulmonary embolism (HCC) POA: Unknown    Aortic thrombus (HCC) POA: Unknown    Splenic infarct POA: Unknown    Renal infarct (HCC) POA: Unknown    Cerebrovascular accident (CVA) due to embolism (HCC) POA: Unknown    Septic embolism (HCC) POA: Unknown    Wound infection complicating hardware, sequela POA: Unknown    S/P lumbar fusion POA: Unknown    Encephalopathy POA: Unknown  Resolved Problems:    * No resolved hospital problems. *      FOLLOW UP  No future appointments.  Raffi Caro M.D.  [facility] [id number] [phone]            MEDICATIONS ON DISCHARGE     Medication List      You have not been prescribed any medications.         Allergies  No Known Allergies    DIET  No orders of the defined types were placed in this encounter.      ACTIVITY  As tolerated.  Weight bearing as tolerated    CONSULTATIONS  Neurology  Critical Care  Palliative  Physiatry  General Surgery    PROCEDURES  Arterial line 3/5/22    LABORATORY  Lab Results   Component Value Date    SODIUM 151 (H) 03/08/2022    POTASSIUM 3.5 (L) 03/08/2022     CHLORIDE 118 (H) 03/08/2022    CO2 24 03/08/2022    GLUCOSE 138 (H) 03/08/2022    BUN 19 03/08/2022    CREATININE 0.98 03/08/2022        Lab Results   Component Value Date    WBC 8.9 03/08/2022    HEMOGLOBIN 8.9 (L) 03/08/2022    HEMATOCRIT 28.9 (L) 03/08/2022    PLATELETCT 209 03/08/2022        Total time of the discharge process exceeds 60 minutes.